# Patient Record
Sex: FEMALE | Race: WHITE | NOT HISPANIC OR LATINO | Employment: FULL TIME | URBAN - METROPOLITAN AREA
[De-identification: names, ages, dates, MRNs, and addresses within clinical notes are randomized per-mention and may not be internally consistent; named-entity substitution may affect disease eponyms.]

---

## 2017-06-17 ENCOUNTER — TRANSFERRED RECORDS (OUTPATIENT)
Dept: HEALTH INFORMATION MANAGEMENT | Facility: CLINIC | Age: 27
End: 2017-06-17

## 2017-10-08 ENCOUNTER — HEALTH MAINTENANCE LETTER (OUTPATIENT)
Age: 27
End: 2017-10-08

## 2018-01-15 ENCOUNTER — OFFICE VISIT (OUTPATIENT)
Dept: URGENT CARE | Facility: URGENT CARE | Age: 28
End: 2018-01-15
Payer: MEDICAID

## 2018-01-15 VITALS
BODY MASS INDEX: 28.1 KG/M2 | WEIGHT: 184.8 LBS | HEART RATE: 76 BPM | OXYGEN SATURATION: 97 % | TEMPERATURE: 97.7 F | DIASTOLIC BLOOD PRESSURE: 71 MMHG | SYSTOLIC BLOOD PRESSURE: 130 MMHG

## 2018-01-15 DIAGNOSIS — R39.89 URINARY PROBLEM: ICD-10-CM

## 2018-01-15 DIAGNOSIS — N30.00 ACUTE CYSTITIS WITHOUT HEMATURIA: Primary | ICD-10-CM

## 2018-01-15 DIAGNOSIS — R82.90 NONSPECIFIC FINDING ON EXAMINATION OF URINE: ICD-10-CM

## 2018-01-15 LAB
ALBUMIN UR-MCNC: ABNORMAL MG/DL
APPEARANCE UR: ABNORMAL
BACTERIA #/AREA URNS HPF: ABNORMAL /HPF
BILIRUB UR QL STRIP: NEGATIVE
COLOR UR AUTO: YELLOW
GLUCOSE UR STRIP-MCNC: NEGATIVE MG/DL
HGB UR QL STRIP: ABNORMAL
KETONES UR STRIP-MCNC: NEGATIVE MG/DL
LEUKOCYTE ESTERASE UR QL STRIP: ABNORMAL
NITRATE UR QL: POSITIVE
NON-SQ EPI CELLS #/AREA URNS LPF: ABNORMAL /LPF
PH UR STRIP: 6 PH (ref 5–7)
RBC #/AREA URNS AUTO: >100 /HPF
SOURCE: ABNORMAL
SP GR UR STRIP: 1.02 (ref 1–1.03)
UROBILINOGEN UR STRIP-ACNC: 0.2 EU/DL (ref 0.2–1)
WBC #/AREA URNS AUTO: ABNORMAL /HPF

## 2018-01-15 PROCEDURE — 87088 URINE BACTERIA CULTURE: CPT | Performed by: PHYSICIAN ASSISTANT

## 2018-01-15 PROCEDURE — 87086 URINE CULTURE/COLONY COUNT: CPT | Performed by: PHYSICIAN ASSISTANT

## 2018-01-15 PROCEDURE — 99213 OFFICE O/P EST LOW 20 MIN: CPT | Performed by: PHYSICIAN ASSISTANT

## 2018-01-15 PROCEDURE — 81001 URINALYSIS AUTO W/SCOPE: CPT | Performed by: PHYSICIAN ASSISTANT

## 2018-01-15 PROCEDURE — 87186 SC STD MICRODIL/AGAR DIL: CPT | Performed by: PHYSICIAN ASSISTANT

## 2018-01-15 RX ORDER — SULFAMETHOXAZOLE/TRIMETHOPRIM 800-160 MG
1 TABLET ORAL 2 TIMES DAILY
Qty: 10 TABLET | Refills: 0 | Status: SHIPPED | OUTPATIENT
Start: 2018-01-15 | End: 2018-01-20

## 2018-01-15 NOTE — MR AVS SNAPSHOT
After Visit Summary   1/15/2018    Shanna Smith    MRN: [id number]           Patient Information     Date Of Birth          1990        Visit Information        Provider Department      1/15/2018 12:45 PM Diamante Heard PA-C [facility]        Today's Diagnoses     Acute cystitis without hematuria    -  1    Urinary problem           Follow-ups after your visit        Who to contact     If you have questions or need follow up information about today's clinic visit or your schedule please contact [facility] directly at [phone].  Normal or non-critical lab and imaging results will be communicated to you by [employer]hart, letter or phone within 4 business days after the clinic has received the results. If you do not hear from us within 7 days, please contact the clinic through Zume Lifet or phone. If you have a critical or abnormal lab result, we will notify you by phone as soon as possible.  Submit refill requests through Koogame or call your pharmacy and they will forward the refill request to us. Please allow 3 business days for your refill to be completed.          Additional Information About Your Visit        MyChart Information     Koogame gives you secure access to your electronic health record. If you see a primary care provider, you can also send messages to your care team and make appointments. If you have questions, please call your primary care clinic.  If you do not have a primary care provider, please call [phone] and they will assist you.        Care EveryWhere ID     This is your Care EveryWhere ID. This could be used by other organizations to access your [place] medical records  [id number]        Your Vitals Were     Pulse Temperature Pulse Oximetry BMI (Body Mass Index)          76 97.7  F (36.5  C) (Oral) 97% 28.1 kg/m2         Blood Pressure from Last 3 Encounters:   01/15/18 130/71   05/27/16 128/79   04/06/16 131/89    Weight from  Last 3 Encounters:   01/15/18 184 lb 12.8 oz (83.8 kg)   05/27/16 186 lb 3.2 oz (84.5 kg)   04/06/16 188 lb 8 oz (85.5 kg)              We Performed the Following     UA with Microscopic reflex to Culture          Today's Medication Changes          These changes are accurate as of: 1/15/18  1:58 PM.  If you have any questions, ask your nurse or doctor.               Start taking these medicines.        Dose/Directions    sulfamethoxazole-trimethoprim 800-160 MG per tablet   Commonly known as:  BACTRIM DS/SEPTRA DS   Used for:  Acute cystitis without hematuria   Started by:  Diamante Heard PA-C        Dose:  1 tablet   Take 1 tablet by mouth 2 times daily for 5 days   Quantity:  10 tablet   Refills:  0            Where to get your medicines      These medications were sent to [facility] - [place], [place] - [address]  [address], [facility] [address]     Phone:  [phone]     sulfamethoxazole-trimethoprim 800-160 MG per tablet                Primary Care Provider Office Phone # Fax #    Anastasiya Porter [phone]       [address] [address]        Equal Access to Services     FEDERICO HOFFMAN AH: Hadii juliann umana hadasho Soomaali, waaxda luqadaha, qaybta kaalmada adeegyada, waxay zachin hayjennifer dumont. So [facility] [phone].    ATENCIÓN: Si habla español, tiene a soto disposición servicios gratuitos de asistencia lingüística. Llame al [phone].    We comply with applicable federal civil rights laws and [place] laws. We do not discriminate on the basis of race, color, national origin, age, disability, sex, sexual orientation, or gender identity.            Thank you!     Thank you for choosing [facility]  for your care. Our goal is always to provide you with excellent care. Hearing back from our patients is one way we can continue to improve our services. Please take a few  minutes to complete the written survey that you may receive in the mail after your visit with us. Thank you!             Your Updated Medication List - Protect others around you: Learn how to safely use, store and throw away your medicines at www.disposemymeds.org.          This list is accurate as of: 1/15/18  1:58 PM.  Always use your most recent med list.                   Brand Name Dispense Instructions for use Diagnosis    fluticasone 50 MCG/ACT spray    FLONASE    16 g    Spray 1-2 sprays into both nostrils daily    Seasonal allergic rhinitis       levonorgestrel 20 MCG/24HR IUD    MIRENA    1 each    One device, intrauterine, for up to 5 years.    Encounter for IUD insertion       loratadine 10 MG tablet    CLARITIN    90 tablet    Take 1 tablet (10 mg) by mouth daily    Seasonal allergic rhinitis       olopatadine HCl 0.2 % Soln    PATADAY    2.5 mL    Place 1 drop into both eyes daily    Seasonal allergic rhinitis       sulfamethoxazole-trimethoprim 800-160 MG per tablet    BACTRIM DS/SEPTRA DS    10 tablet    Take 1 tablet by mouth 2 times daily for 5 days    Acute cystitis without hematuria       TYLENOL 325 MG tablet   Generic drug:  acetaminophen      Take 325-650 mg by mouth every 6 hours as needed. As needed

## 2018-01-15 NOTE — PROGRESS NOTES
SUBJECTIVE:   Shanna Smith is a 28 year old female who presents to clinic today for the following health issues:      UTI      Duration: 1 day    Description (location/character/radiation): pain when urinating, urgency    Intensity:  moderate    Accompanying signs and symptoms: pain when urinating, urgency    History (similar episodes/previous evaluation): None    Precipitating or alleviating factors: urinating    Therapies tried and outcome: None     LMP-1/7/2018, normal      No Known Allergies    Past Medical History:   Diagnosis Date     Closed fracture of metatarsal of right foot 6/12    V MT     H/O colposcopy with cervical biopsy 3/2015    Negative     History of chlamydia 08/2011, 11/2011    repeat infections     LSIL (low grade squamous intraepithelial lesion) on Pap smear 2/4/15    + HR HPV 18         Current Outpatient Prescriptions on File Prior to Visit:  loratadine (CLARITIN) 10 MG tablet Take 1 tablet (10 mg) by mouth daily   fluticasone (FLONASE) 50 MCG/ACT nasal spray Spray 1-2 sprays into both nostrils daily   olopatadine HCl (PATADAY) 0.2 % SOLN Place 1 drop into both eyes daily   levonorgestrel (MIRENA) 20 MCG/24HR IUD One device, intrauterine, for up to 5 years.   acetaminophen (TYLENOL) 325 MG tablet Take 325-650 mg by mouth every 6 hours as needed. As needed     No current facility-administered medications on file prior to visit.     Social History   Substance Use Topics     Smoking status: Never Smoker     Smokeless tobacco: Never Used     Alcohol use No       ROS:  General: negative for fever  ABD: Denies abd pain  : as above    OBJECTIVE:  /71 (BP Location: Left arm, Patient Position: Chair, Cuff Size: Adult Regular)  Pulse 76  Temp 97.7  F (36.5  C) (Oral)  Wt 184 lb 12.8 oz (83.8 kg)  SpO2 97%  BMI 28.1 kg/m2   General:   awake, alert, and cooperative.  NAD.   Head: Normocephalic, atraumatic.  Eyes: Conjunctiva clear, non icteric.   ABD: soft, no tenderness to palpation ,  no rigidity, guarding or rebound . No CVAT  Neuro: Alert and oriented - normal speech.   Results for orders placed or performed in visit on 01/15/18   UA with Microscopic reflex to Culture   Result Value Ref Range    Color Urine Yellow     Appearance Urine Cloudy     Glucose Urine Negative NEG^Negative mg/dL    Bilirubin Urine Negative NEG^Negative    Ketones Urine Negative NEG^Negative mg/dL    Specific Gravity Urine 1.020 1.003 - 1.035    pH Urine 6.0 5.0 - 7.0 pH    Protein Albumin Urine Trace (A) NEG^Negative mg/dL    Urobilinogen Urine 0.2 0.2 - 1.0 EU/dL    Nitrite Urine Positive (A) NEG^Negative    Blood Urine Large (A) NEG^Negative    Leukocyte Esterase Urine Small (A) NEG^Negative    Source Midstream Urine     WBC Urine 5-10 (A) OTO2^O - 2 /HPF    RBC Urine >100 (A) OTO2^O - 2 /HPF    Squamous Epithelial /LPF Urine Moderate (A) FEW^Few /LPF    Bacteria Urine Many (A) NEG^Negative /HPF       ASSESSMENT:      ICD-10-CM    1. Acute cystitis without hematuria N30.00 sulfamethoxazole-trimethoprim (BACTRIM DS/SEPTRA DS) 800-160 MG per tablet   2. Urinary problem R39.89 UA with Microscopic reflex to Culture             PLAN:   As per ordered above.  Drink plenty of fluids.  Prevention and treatment of UTI's discussed. Follow up with primary care physician if not improving.  Advised about symptoms which might herald more serious problems.      Diamante Heard PA-C

## 2018-01-15 NOTE — NURSING NOTE
"Chief Complaint   Patient presents with     UTI     Patient complains of urinary problems       Initial /71 (BP Location: Left arm, Patient Position: Chair, Cuff Size: Adult Regular)  Pulse 76  Temp 97.7  F (36.5  C) (Oral)  Wt 184 lb 12.8 oz (83.8 kg)  SpO2 97%  BMI 28.1 kg/m2 Estimated body mass index is 28.1 kg/(m^2) as calculated from the following:    Height as of 5/27/16: 5' 8\" (1.727 m).    Weight as of this encounter: 184 lb 12.8 oz (83.8 kg).  Medication Reconciliation: complete       Alicia Fishman    "

## 2018-01-16 LAB
BACTERIA SPEC CULT: ABNORMAL
SPECIMEN SOURCE: ABNORMAL

## 2018-08-01 ENCOUNTER — OFFICE VISIT (OUTPATIENT)
Dept: OBGYN | Facility: CLINIC | Age: 28
End: 2018-08-01
Payer: COMMERCIAL

## 2018-08-01 VITALS
OXYGEN SATURATION: 98 % | HEART RATE: 82 BPM | SYSTOLIC BLOOD PRESSURE: 119 MMHG | DIASTOLIC BLOOD PRESSURE: 81 MMHG | BODY MASS INDEX: 28.22 KG/M2 | WEIGHT: 185.6 LBS

## 2018-08-01 DIAGNOSIS — Z32.01 PREGNANCY TEST POSITIVE: ICD-10-CM

## 2018-08-01 DIAGNOSIS — N91.2 ABSENCE OF MENSTRUATION: Primary | ICD-10-CM

## 2018-08-01 LAB — BETA HCG QUAL IFA URINE: POSITIVE

## 2018-08-01 PROCEDURE — 84703 CHORIONIC GONADOTROPIN ASSAY: CPT | Performed by: OBSTETRICS & GYNECOLOGY

## 2018-08-01 PROCEDURE — 99213 OFFICE O/P EST LOW 20 MIN: CPT | Performed by: OBSTETRICS & GYNECOLOGY

## 2018-08-01 RX ORDER — PRENATAL VIT/IRON FUM/FOLIC AC 27MG-0.8MG
1 TABLET ORAL DAILY
Qty: 100 TABLET | Refills: 3 | Status: SHIPPED | OUTPATIENT
Start: 2018-08-01 | End: 2020-02-12

## 2018-08-01 NOTE — PROGRESS NOTES
Shanna is a 28 year old,  3 para  who presents today with absence of menses. LMP was 2018.    Obstetric History       T2      L2     SAB1   TAB0   Ectopic0   Multiple0   Live Births2       # Outcome Date GA Lbr Kelechi/2nd Weight Sex Delivery Anes PTL Lv   4 Current            3 Term 10/15/15 38w0d  10 lb 3 oz (4.621 kg) F    [facility]      Name: Tanisha Luong   2 Term 13 40w0d  8 lb 7 oz (3.827 kg) F Vag-Spont   [facility]      Name: Sherrie Meyers SAB 12 12w0d                 Gynecological History         LMP:  2018      no STD/ no PID/ she did use an IUD  no abnormal pap smears       Past Medical History:   Diagnosis Date     Closed fracture of metatarsal of right foot     V MT     H/O colposcopy with cervical biopsy 3/2015    Negative     History of chlamydia 2011, 2011    repeat infections     LSIL (low grade squamous intraepithelial lesion) on Pap smear 2/4/15    + HR HPV 18       Past Surgical History:   Procedure Laterality Date     NO HISTORY OF SURGERY         No Known Allergies    Current Outpatient Prescriptions   Medication Sig Dispense Refill     acetaminophen (TYLENOL) 325 MG tablet Take 325-650 mg by mouth every 6 hours as needed. As needed       fluticasone (FLONASE) 50 MCG/ACT nasal spray Spray 1-2 sprays into both nostrils daily (Patient not taking: Reported on 2018) 16 g 4     loratadine (CLARITIN) 10 MG tablet Take 1 tablet (10 mg) by mouth daily (Patient not taking: Reported on 2018) 90 tablet 3       Social History   Substance Use Topics     Smoking status: Never Smoker     Smokeless tobacco: Never Used     Alcohol use No       Family History   Problem Relation Age of Onset     Asthma Mother      Hypertension Mother      Asthma Brother      Diabetes No family hx of      Breast Cancer No family hx of      Cancer - colorectal No family hx of          ROS:    4 point ROS including Respiratory, CV, GI and , other than that noted in the HPI  and the PMH,  is negative       EXAM:  /81 (BP Location: Right arm, Cuff Size: Adult Regular)  Pulse 82  Wt 185 lb 9.6 oz (84.2 kg)  LMP 06/22/2018 (Exact Date)  SpO2 98%  BMI 28.22 kg/m2  General:  WNWD female, NAD  Alert  Oriented x 3  Gait:  Normal  Skin:  Normal skin turgor  HEENT:  NC/AT, EOMI  Abdomen:  Non-tender, non-distended.  Pelvic exam:  Not performed  Extremities:  No clubbing, no cyanosis and no edema.      A: Missed Menses  Weeks gestation: 5.5 weeks  TULIO: March 29, 2019.      P: 1) Prenatal vitamins  2) Diet, exercise, work, and environmental hazards reviewed. Toxoplasmosis precautions. Discussed avoidance of tobacco, ETOH, and street drugs. Signs and symptoms to monitor for and report discussed. Will schedule first OB visit at 10-12 weeks gestation.   Approximately 20 minutes face to face time was spent with the patient today entirely in education and counseling regarding first trimester anticipatory guidance, prenatal visit schedule, delivery hospital.    Alpesh Rodríguez MD

## 2018-08-01 NOTE — MR AVS SNAPSHOT
After Visit Summary   8/1/2018    Shanna Smith    MRN: [id number]           Patient Information     Date Of Birth          1990        Visit Information        Provider Department      8/1/2018 11:00 AM Alpesh Rodríguez MD [facility]        Today's Diagnoses     Absence of menstruation    -  1    Pregnancy test positive           Follow-ups after your visit        Follow-up notes from your care team     Return in about 5 weeks (around 9/5/2018).      Your next 10 appointments already scheduled     Sep 05, 2018  8:30 AM CDT   Office Visit with Alpesh Rodríguez MD   [facility] ([facility])    [address] [place] [id number] [phone]           Bring a current list of meds and any records pertaining to this visit. For Physicals, please bring immunization records and any forms needing to be filled out. Please arrive 10 minutes early to complete paperwork.              Who to contact     If you have questions or need follow up information about today's clinic visit or your schedule please contact [facility] directly at [phone].  Normal or non-critical lab and imaging results will be communicated to you by MyChart, letter or phone within 4 business days after the clinic has received the results. If you do not hear from us within 7 days, please contact the clinic through Trad[employer] or phone. If you have a critical or abnormal lab result, we will notify you by phone as soon as possible.  Submit refill requests through KeepFu or call your pharmacy and they will forward the refill request to us. Please allow 3 business days for your refill to be completed.          Additional Information About Your Visit        MyChart Information     Keep[employer] gives you secure access to your electronic health record. If you see a primary care provider, you can also send messages to your care team and make appointments. If you have  questions, please call your primary care clinic.  If you do not have a primary care provider, please call [phone] and they will assist you.        Care EveryWhere ID     This is your Care EveryWhere ID. This could be used by other organizations to access your [place] medical records  [id number]        Your Vitals Were     Pulse Last Period Pulse Oximetry BMI (Body Mass Index)          82 06/22/2018 (Exact Date) 98% 28.22 kg/m2         Blood Pressure from Last 3 Encounters:   08/01/18 119/81   01/15/18 130/71   05/27/16 128/79    Weight from Last 3 Encounters:   08/01/18 185 lb 9.6 oz (84.2 kg)   01/15/18 184 lb 12.8 oz (83.8 kg)   05/27/16 186 lb 3.2 oz (84.5 kg)              We Performed the Following     Beta HCG qual IFA urine          Today's Medication Changes          These changes are accurate as of 8/1/18 11:48 AM.  If you have any questions, ask your nurse or doctor.               Start taking these medicines.        Dose/Directions    prenatal multivitamin plus iron 27-0.8 MG Tabs per tablet   Used for:  Pregnancy test positive   Started by:  Alpesh Rodríguez MD        Dose:  1 tablet   Take 1 tablet by mouth daily   Quantity:  100 tablet   Refills:  3            Where to get your medicines      These medications were sent to [facility] [place] - [address] [place] [address]  [address] [place] [address] [address], [facility] [address]     Phone:  [phone]     prenatal multivitamin plus iron 27-0.8 MG Tabs per tablet                Primary Care Provider Office Phone # Fax #    Anastasiya Porter [phone] [address] [facility] [address]        Equal Access to Services     AUDREY HOFFMAN AH: Jovani Cobb, nell bradley, tucker bautista, eliezer dumont. So [facility] [phone].    ATENCIÓN: Si habla español, tiene a soto disposición servicios gratuitos de asistencia lingüística. Llame al  [phone].    We comply with applicable federal civil rights laws and [place] laws. We do not discriminate on the basis of race, color, national origin, age, disability, sex, sexual orientation, or gender identity.            Thank you!     Thank you for choosing [facility]  for your care. Our goal is always to provide you with excellent care. Hearing back from our patients is one way we can continue to improve our services. Please take a few minutes to complete the written survey that you may receive in the mail after your visit with us. Thank you!             Your Updated Medication List - Protect others around you: Learn how to safely use, store and throw away your medicines at www.disposemymeds.org.          This list is accurate as of 8/1/18 11:48 AM.  Always use your most recent med list.                   Brand Name Dispense Instructions for use Diagnosis    fluticasone 50 MCG/ACT spray    FLONASE    16 g    Spray 1-2 sprays into both nostrils daily    Seasonal allergic rhinitis       loratadine 10 MG tablet    CLARITIN    90 tablet    Take 1 tablet (10 mg) by mouth daily    Seasonal allergic rhinitis       prenatal multivitamin plus iron 27-0.8 MG Tabs per tablet     100 tablet    Take 1 tablet by mouth daily    Pregnancy test positive       TYLENOL 325 MG tablet   Generic drug:  acetaminophen      Take 325-650 mg by mouth every 6 hours as needed. As needed

## 2018-08-01 NOTE — LETTER
August 1, 2018      Shanna [place]  [address] [facility] [address]            To whom it may concern      Shanna has been seen by me.  She is pregnant with an estimated due date of March 29, 2019.        Sincerely,          Alpesh Rodríguez MD

## 2018-08-03 ENCOUNTER — OFFICE VISIT (OUTPATIENT)
Dept: FAMILY MEDICINE | Facility: CLINIC | Age: 28
End: 2018-08-03
Payer: COMMERCIAL

## 2018-08-03 VITALS
BODY MASS INDEX: 28.46 KG/M2 | TEMPERATURE: 98.7 F | HEIGHT: 68 IN | DIASTOLIC BLOOD PRESSURE: 70 MMHG | SYSTOLIC BLOOD PRESSURE: 118 MMHG | WEIGHT: 187.8 LBS | RESPIRATION RATE: 15 BRPM | OXYGEN SATURATION: 98 % | HEART RATE: 80 BPM

## 2018-08-03 DIAGNOSIS — H10.33 ACUTE CONJUNCTIVITIS OF BOTH EYES, UNSPECIFIED ACUTE CONJUNCTIVITIS TYPE: Primary | ICD-10-CM

## 2018-08-03 PROCEDURE — 99213 OFFICE O/P EST LOW 20 MIN: CPT | Performed by: FAMILY MEDICINE

## 2018-08-03 RX ORDER — POLYMYXIN B SULFATE AND TRIMETHOPRIM 1; 10000 MG/ML; [USP'U]/ML
1-2 SOLUTION OPHTHALMIC EVERY 6 HOURS
Qty: 3 ML | Refills: 0 | Status: SHIPPED | OUTPATIENT
Start: 2018-08-03 | End: 2018-08-10

## 2018-08-03 NOTE — MR AVS SNAPSHOT
After Visit Summary   8/3/2018    Shanna Smith    MRN: [id number]           Patient Information     Date Of Birth          1990        Visit Information        Provider Department      8/3/2018 8:20 AM Leonel David MD [facility]        Today's Diagnoses     Acute conjunctivitis of both eyes, unspecified acute conjunctivitis type    -  1      Care Instructions    [facility]    If you have any questions regarding to your visit please contact your care team:       Team Purple:   Clinic Hours Telephone Number   Dr. Anastasiya Benjamin   7am-7pm  Monday - Thursday   7am-5pm  Fridays  [phone]  (Appointment scheduling available 24/7)    Questions about your recent visit?   Team Line:  [phone]   Urgent Care - [place] and [facility] - 11am-9pm Monday-Friday Saturday-Sunday- 9am-5pm   [place] - 5pm-9pm Monday-Friday Saturday-Sunday- 9am-5pm  [phone] - [place]  [phone] [facility]       What options do I have for a visit other than an office visit? We offer electronic visits (e-visits) and telephone visits, when medically appropriate.  Please check with your medical insurance to see if these types of visits are covered, as you will be responsible for any charges that are not paid by your insurance.      You can use "[employer] (secure electronic communication) to access to your chart, send your primary care provider a message, or make an appointment. Ask a team member how to get started.     For a price quote for your services, please call our Consumer Price Line at [phone] or our Imaging Cost estimation line at [phone] (for imaging tests).    Norberto Ortiz    What Is Conjunctivitis?    Conjunctivitis is an irritation or infection. It affects the membrane that covers the white of your eye and the inside of your eyelid (conjunctiva). It can happen to one or both eyes. The  membrane swells and the blood vessels enlarge (dilate). This makes your eye red. That's why conjunctivitis is sometimes called red eye or pink eye.  What are the symptoms?  If you have one or more of these symptoms, see an eye healthcare provider:    Redness in and around your eye    Eyes that are puffy and sore    Itching, burning, or stinging eyes    Watery eyes or discharge from your eye    Eyelids that are crusty or stuck together when you wake up in the morning    Pink color in the whites of one or both eyes    Sensitivity to bright light  Getting treatment quickly can help prevent damage to your eyes.  How is it diagnosed?  Conjunctivitis is usually a minor eye infection. But it can sometimes become a more serious problem. Some more serious eye diseases have symptoms that look like conjunctivitis. So it's important for an eye healthcare provider to diagnose you. Your eye healthcare provider will ask about your symptoms and any medicines you take. He or she will ask about any illnesses or medical conditions you may have. The healthcare provider will also check your eyes with a hand-held light and a special microscope called a slit lamp.  Date Last Reviewed: 10/1/2017    9803-3218 [employer]. [address] [place] [address]. All rights reserved. This information is not intended as a substitute for professional medical care. Always follow your healthcare professional's instructions.                Follow-ups after your visit        Your next 10 appointments already scheduled     Sep 05, 2018  8:30 AM CDT   Office Visit with Alpesh Rodríguez MD   [facility] [address]   [phone]           Bring a current list of meds and any records pertaining to this visit. For Physicals, please bring immunization records and any forms needing to be filled out. Please arrive 10 minutes early to complete paperwork.     "          Who to contact     If you have questions or need follow up information about today's clinic visit or your schedule please contact [facility] directly at [phone].  Normal or non-critical lab and imaging results will be communicated to you by MyChart, letter or phone within 4 business days after the clinic has received the results. If you do not hear from us within 7 days, please contact the clinic through [employer] or phone. If you have a critical or abnormal lab result, we will notify you by phone as soon as possible.  Submit refill requests through [employer] or call your pharmacy and they will forward the refill request to us. Please allow 3 business days for your refill to be completed.          Additional Information About Your Visit        [employer] Information     [employer] gives you secure access to your electronic health record. If you see a primary care provider, you can also send messages to your care team and make appointments. If you have questions, please call your primary care clinic.  If you do not have a primary care provider, please call [phone] and they will assist you.        Care EveryWhere ID     This is your Care EveryWhere ID. This could be used by other organizations to access your [place] medical records  [id number]        Your Vitals Were     Pulse Temperature Respirations Height Last Period Pulse Oximetry    80 98.7  F (37.1  C) (Oral) 15 5' 8\" (1.727 m) 06/22/2018 (Exact Date) 98%    Breastfeeding? BMI (Body Mass Index)                No 28.55 kg/m2           Blood Pressure from Last 3 Encounters:   08/03/18 118/70   08/01/18 119/81   01/15/18 130/71    Weight from Last 3 Encounters:   08/03/18 187 lb 12.8 oz (85.2 kg)   08/01/18 185 lb 9.6 oz (84.2 kg)   01/15/18 184 lb 12.8 oz (83.8 kg)              Today, you had the following     No orders found for display         Today's Medication Changes          These changes are accurate as of 8/3/18  8:41 AM.  If you " have any questions, ask your nurse or doctor.               Start taking these medicines.        Dose/Directions    trimethoprim-polymyxin b ophthalmic solution   Commonly known as:  POLYTRIM   Used for:  Acute conjunctivitis of both eyes, unspecified acute conjunctivitis type   Started by:  Leonel David MD        Dose:  1-2 drop   Apply 1-2 drops to eye every 6 hours for 7 days   Quantity:  3 mL   Refills:  0            Where to get your medicines      These medications were sent to [facility] [address] [place] [address]  [address] [facility] [address], [facility] [address]     Phone:  [phone]     trimethoprim-polymyxin b ophthalmic solution                Primary Care Provider Office Phone # Fax #    Anastasiya Porter [phone] [address] [facility] [address]        Equal Access to Services     [facility]: Hadii juliann ku hadasho Soomaali, waaxda luqadaha, qaybta kaalmada adeegyada, waxay zachin hayjennifer deleon . So [facility] [phone].    ATENCIÓN: Si habla español, tiene a soto disposición servicios gratuitos de asistencia lingüística. Llame al [phone].    We comply with applicable federal civil rights laws and [place] laws. We do not discriminate on the basis of race, color, national origin, age, disability, sex, sexual orientation, or gender identity.            Thank you!     Thank you for choosing [facility]  for your care. Our goal is always to provide you with excellent care. Hearing back from our patients is one way we can continue to improve our services. Please take a few minutes to complete the written survey that you may receive in the mail after your visit with us. Thank you!             Your Updated Medication List - Protect others around you: Learn how to safely use, store and throw away your medicines at www.disposemymeds.org.          This list is accurate as of 8/3/18  8:41 AM.  Always  use your most recent med list.                   Brand Name Dispense Instructions for use Diagnosis    fluticasone 50 MCG/ACT spray    FLONASE    16 g    Spray 1-2 sprays into both nostrils daily    Seasonal allergic rhinitis       loratadine 10 MG tablet    CLARITIN    90 tablet    Take 1 tablet (10 mg) by mouth daily    Seasonal allergic rhinitis       prenatal multivitamin plus iron 27-0.8 MG Tabs per tablet     100 tablet    Take 1 tablet by mouth daily    Pregnancy test positive       trimethoprim-polymyxin b ophthalmic solution    POLYTRIM    3 mL    Apply 1-2 drops to eye every 6 hours for 7 days    Acute conjunctivitis of both eyes, unspecified acute conjunctivitis type       TYLENOL 325 MG tablet   Generic drug:  acetaminophen      Take 325-650 mg by mouth every 6 hours as needed. As needed

## 2018-08-03 NOTE — PATIENT INSTRUCTIONS
[facility]    If you have any questions regarding to your visit please contact your care team:       Team Purple:   Clinic Hours Telephone Number   Dr. Anastasiya Benjamin   7am-7pm  Monday - Thursday   7am-5pm  Fridays  [phone]  (Appointment scheduling available 24/7)    Questions about your recent visit?   Team Line:  [phone]   Urgent Care - [place] and [facility] - 11am-9pm Monday-Friday Saturday-Sunday- 9am-5pm   [place] - 5pm-9pm Monday-Friday Saturday-Sunday- 9am-5pm  [phone] - [place]  [phone] [facility]       What options do I have for a visit other than an office visit? We offer electronic visits (e-visits) and telephone visits, when medically appropriate.  Please check with your medical insurance to see if these types of visits are covered, as you will be responsible for any charges that are not paid by your insurance.      You can use [employer] (secure electronic communication) to access to your chart, send your primary care provider a message, or make an appointment. Ask a team member how to get started.     For a price quote for your services, please call our Consumer Price Line at [phone] or our Imaging Cost estimation line at [phone] (for imaging tests).    Norberto Ortiz    What Is Conjunctivitis?    Conjunctivitis is an irritation or infection. It affects the membrane that covers the white of your eye and the inside of your eyelid (conjunctiva). It can happen to one or both eyes. The membrane swells and the blood vessels enlarge (dilate). This makes your eye red. That's why conjunctivitis is sometimes called red eye or pink eye.  What are the symptoms?  If you have one or more of these symptoms, see an eye healthcare provider:    Redness in and around your eye    Eyes that are puffy and sore    Itching, burning, or stinging eyes    Watery eyes or discharge from your eye    Eyelids that are  crusty or stuck together when you wake up in the morning    Pink color in the whites of one or both eyes    Sensitivity to bright light  Getting treatment quickly can help prevent damage to your eyes.  How is it diagnosed?  Conjunctivitis is usually a minor eye infection. But it can sometimes become a more serious problem. Some more serious eye diseases have symptoms that look like conjunctivitis. So it's important for an eye healthcare provider to diagnose you. Your eye healthcare provider will ask about your symptoms and any medicines you take. He or she will ask about any illnesses or medical conditions you may have. The healthcare provider will also check your eyes with a hand-held light and a special microscope called a slit lamp.  Date Last Reviewed: 10/1/2017    5998-0204 [employer]. [address] [place] [address], [place] [address]. All rights reserved. This information is not intended as a substitute for professional medical care. Always follow your healthcare professional's instructions.

## 2018-08-03 NOTE — PROGRESS NOTES
"  SUBJECTIVE:   Shanna Smith is a 28 year old female who presents to clinic today for the following health issues:    Eye(s) Problem    Duration: x this morning     Description:  Location: bilateral, but more so the left eye  Pain: YES  Redness: YES, Pinkish  Discharge: YES    Accompanying signs and symptoms: none    History (Trauma, foreign body exposure,): None    Precipitating or alleviating factors (contact use): None    Therapies tried and outcome: None    Problem list and histories reviewed & adjusted, as indicated.  Additional history: as documented    Patient Active Problem List   Diagnosis     CARDIOVASCULAR SCREENING; LDL GOAL LESS THAN 160     LSIL (low grade squamous intraepithelial lesion) on Pap smear     Past Surgical History:   Procedure Laterality Date     NO HISTORY OF SURGERY         Social History   Substance Use Topics     Smoking status: Never Smoker     Smokeless tobacco: Never Used     Alcohol use No     Family History   Problem Relation Age of Onset     Asthma Mother      Hypertension Mother      Asthma Brother      Diabetes No family hx of      Breast Cancer No family hx of      Cancer - colorectal No family hx of          Reviewed and updated as needed this visit by clinical staff  Tobacco  Allergies  Meds  Med Hx  Surg Hx  Fam Hx  Soc Hx      ROS:  Constitutional, ENT, cardiovascular, pulmonary, gi and gu systems are negative, except as otherwise noted.    OBJECTIVE:     /70 (BP Location: Left arm, Patient Position: Chair, Cuff Size: Adult Regular)  Pulse 80  Temp 98.7  F (37.1  C) (Oral)  Resp 15  Ht 5' 8\" (1.727 m)  Wt 187 lb 12.8 oz (85.2 kg)  LMP 06/22/2018 (Exact Date)  SpO2 98%  Breastfeeding? No  BMI 28.55 kg/m2  Body mass index is 28.55 kg/(m^2).  GENERAL: healthy, alert and no distress  EYES: Left eye with scleral erythema  NECK: no adenopathy and thyroid normal to palpation  RESP: lungs clear to auscultation - no rales, rhonchi or wheezes  CV: regular rate " and rhythm, normal S1 S2, no S3 or S4, no murmur, click or rub  ABDOMEN: soft, nontender, no masses and bowel sounds normal  MS: no gross musculoskeletal defects noted, no edema    Diagnostic Test Results:  none     ASSESSMENT/PLAN:     (H10.33) Acute conjunctivitis of both eyes, unspecified acute conjunctivitis type  (primary encounter diagnosis)  Comment: Antibiotic drops per order. Hygiene discussed. If other family members develop same condition, may use same medication for them if they are not known to be allergic to it. Call prn.  Plan: trimethoprim-polymyxin b (POLYTRIM) ophthalmic         solution    Leonel David MD  [facility]

## 2018-08-03 NOTE — NURSING NOTE
"Chief Complaint   Patient presents with     Eye Problem     Both eyes, but the left eye is worse     Initial /70 (BP Location: Left arm, Patient Position: Chair, Cuff Size: Adult Regular)  Pulse 80  Temp 98.7  F (37.1  C) (Oral)  Resp 15  Ht 5' 8\" (1.727 m)  Wt 187 lb 12.8 oz (85.2 kg)  LMP 06/22/2018 (Exact Date)  SpO2 98%  Breastfeeding? No  BMI 28.55 kg/m2 Estimated body mass index is 28.55 kg/(m^2) as calculated from the following:    Height as of this encounter: 5' 8\" (1.727 m).    Weight as of this encounter: 187 lb 12.8 oz (85.2 kg).  BP completed using cuff size: daiana Ortiz  "

## 2018-09-05 ENCOUNTER — PRENATAL OFFICE VISIT (OUTPATIENT)
Dept: OBGYN | Facility: CLINIC | Age: 28
End: 2018-09-05
Payer: COMMERCIAL

## 2018-09-05 VITALS
OXYGEN SATURATION: 97 % | SYSTOLIC BLOOD PRESSURE: 121 MMHG | WEIGHT: 193 LBS | BODY MASS INDEX: 29.35 KG/M2 | DIASTOLIC BLOOD PRESSURE: 72 MMHG | HEART RATE: 90 BPM

## 2018-09-05 DIAGNOSIS — O09.291 CURRENT PREGNANCY IN FIRST TRIMESTER WITH HISTORY OF SPONTANEOUS ABORTION DURING PRIOR PREGNANCY: Primary | ICD-10-CM

## 2018-09-05 DIAGNOSIS — Z12.4 PAP SMEAR FOR CERVICAL CANCER SCREENING: ICD-10-CM

## 2018-09-05 LAB
ABO + RH BLD: NORMAL
ABO + RH BLD: NORMAL
BASOPHILS # BLD AUTO: 0 10E9/L (ref 0–0.2)
BASOPHILS NFR BLD AUTO: 0.1 %
BLD GP AB SCN SERPL QL: NORMAL
BLOOD BANK CMNT PATIENT-IMP: NORMAL
DIFFERENTIAL METHOD BLD: NORMAL
EOSINOPHIL # BLD AUTO: 0.2 10E9/L (ref 0–0.7)
EOSINOPHIL NFR BLD AUTO: 2.4 %
ERYTHROCYTE [DISTWIDTH] IN BLOOD BY AUTOMATED COUNT: 12.6 % (ref 10–15)
HBV SURFACE AG SERPL QL IA: NONREACTIVE
HCT VFR BLD AUTO: 37.3 % (ref 35–47)
HGB BLD-MCNC: 12.8 G/DL (ref 11.7–15.7)
HIV 1+2 AB+HIV1 P24 AG SERPL QL IA: NONREACTIVE
LYMPHOCYTES # BLD AUTO: 1.2 10E9/L (ref 0.8–5.3)
LYMPHOCYTES NFR BLD AUTO: 14.9 %
MCH RBC QN AUTO: 31.1 PG (ref 26.5–33)
MCHC RBC AUTO-ENTMCNC: 34.3 G/DL (ref 31.5–36.5)
MCV RBC AUTO: 91 FL (ref 78–100)
MONOCYTES # BLD AUTO: 0.5 10E9/L (ref 0–1.3)
MONOCYTES NFR BLD AUTO: 5.8 %
NEUTROPHILS # BLD AUTO: 6.2 10E9/L (ref 1.6–8.3)
NEUTROPHILS NFR BLD AUTO: 76.8 %
PLATELET # BLD AUTO: 240 10E9/L (ref 150–450)
RBC # BLD AUTO: 4.12 10E12/L (ref 3.8–5.2)
RUBV IGG SERPL IA-ACNC: 45 IU/ML
SPECIMEN EXP DATE BLD: NORMAL
T PALLIDUM AB SER QL: NONREACTIVE
WBC # BLD AUTO: 8.1 10E9/L (ref 4–11)

## 2018-09-05 PROCEDURE — 87340 HEPATITIS B SURFACE AG IA: CPT | Performed by: OBSTETRICS & GYNECOLOGY

## 2018-09-05 PROCEDURE — 86850 RBC ANTIBODY SCREEN: CPT | Performed by: OBSTETRICS & GYNECOLOGY

## 2018-09-05 PROCEDURE — 85025 COMPLETE CBC W/AUTO DIFF WBC: CPT | Performed by: OBSTETRICS & GYNECOLOGY

## 2018-09-05 PROCEDURE — 86901 BLOOD TYPING SEROLOGIC RH(D): CPT | Performed by: OBSTETRICS & GYNECOLOGY

## 2018-09-05 PROCEDURE — 87389 HIV-1 AG W/HIV-1&-2 AB AG IA: CPT | Performed by: OBSTETRICS & GYNECOLOGY

## 2018-09-05 PROCEDURE — 87086 URINE CULTURE/COLONY COUNT: CPT | Performed by: OBSTETRICS & GYNECOLOGY

## 2018-09-05 PROCEDURE — G0145 SCR C/V CYTO,THINLAYER,RESCR: HCPCS | Performed by: OBSTETRICS & GYNECOLOGY

## 2018-09-05 PROCEDURE — 86762 RUBELLA ANTIBODY: CPT | Performed by: OBSTETRICS & GYNECOLOGY

## 2018-09-05 PROCEDURE — 99207 ZZC FIRST OB VISIT: CPT | Performed by: OBSTETRICS & GYNECOLOGY

## 2018-09-05 PROCEDURE — 86900 BLOOD TYPING SEROLOGIC ABO: CPT | Performed by: OBSTETRICS & GYNECOLOGY

## 2018-09-05 PROCEDURE — 86780 TREPONEMA PALLIDUM: CPT | Performed by: OBSTETRICS & GYNECOLOGY

## 2018-09-05 PROCEDURE — 36415 COLL VENOUS BLD VENIPUNCTURE: CPT | Performed by: OBSTETRICS & GYNECOLOGY

## 2018-09-05 NOTE — LETTER
October 24, 2018      Shanna Smith  [address] [facility] [address]    Dear ,      I am happy to inform you that your recent cervical cancer screening test (PAP smear) was normal.      Preventative screenings such as this help to ensure your health for years to come. You should repeat a pap smear in 3 years, unless otherwise directed.      You will still need to return to the clinic every year for your annual exam and other preventive tests.     If you have additional questions regarding this result, please call our registered nurse, Yomaira at [phone].      Sincerely,      Alpesh Rodríguez MD/isaac

## 2018-09-05 NOTE — PROGRESS NOTES
INITIAL OB ASSESSMENT............................................Date: 2018                            ---------------------    Name: Shanna Smith.......................................................................Plan Number: [id number]    Age: 28 year old   : 1990  Phone: [phone] (home)   Address: [address] [place] [address]    Marital Status:    Race/Ethnicity:   Occupation:   [employer]  Partner's Name:  Souleymane Smith    OB Physician: Alpesh Rodríguez       LMP:  Patient's LMP from OB Dating Form:  Patient's last menstrual period was 2018 (exact date).  Regular menses? yes  Menses every month.   Length of menses: 5 days    Obstetrical History  ===================  Obstetric History       T2      L2     SAB1   TAB0   Ectopic0   Multiple0   Live Births2       # Outcome Date GA Lbr Kelechi/2nd Weight Sex Delivery Anes PTL Lv   4 Current            3 Term 10/15/15 38w0d  10 lb 3 oz (4.621 kg) F    KALINA      Name: Tanisha Luong   2 Term 13 40w0d  8 lb 7 oz (3.827 kg) F Vag-Spont   KALINA      Name: Sherrie   1 SAB 12 12w0d                 Past Medical History:  Past Medical History:   Diagnosis Date     Closed fracture of metatarsal of right foot     V MT     H/O colposcopy with cervical biopsy 3/2015    Negative     History of chlamydia 2011, 2011    repeat infections     LSIL (low grade squamous intraepithelial lesion) on Pap smear 2/4/15    + HR HPV 18       Past Surgical History:  Past Surgical History:   Procedure Laterality Date     NO HISTORY OF SURGERY         Current Outpatient Prescriptions   Medication Sig Dispense Refill     Prenatal Vit-Fe Fumarate-FA (PRENATAL MULTIVITAMIN PLUS IRON) 27-0.8 MG TABS per tablet Take 1 tablet by mouth daily 100 tablet 3     acetaminophen (TYLENOL) 325 MG tablet Take 325-650 mg by mouth every 6 hours as needed. As needed       fluticasone (FLONASE) 50  MCG/ACT nasal spray Spray 1-2 sprays into both nostrils daily 16 g 4     loratadine (CLARITIN) 10 MG tablet Take 1 tablet (10 mg) by mouth daily 90 tablet 3       No Known Allergies    Social History     Social History     Marital status:      Spouse name: Souleymane     Number of children: 1     Years of education: N/A     Occupational History      [employer]     Social History Main Topics     Smoking status: Never Smoker     Smokeless tobacco: Never Used     Alcohol use No     Drug use: No     Sexual activity: Yes     Partners: Male     Other Topics Concern     Parent/Sibling W/ Cabg, Mi Or Angioplasty Before 65f 55m? No     Social History Narrative     , Children  No substance abuse, environmental exposures, mental health risks and No financial concerns,pets. Partner support.       Family History   Problem Relation Age of Onset     Asthma Mother      Hypertension Mother      Asthma Brother      Diabetes No family hx of      Breast Cancer No family hx of      Cancer - colorectal No family hx of        Past Medical History of Father of Baby:   No significant medical history     No known genetic disease in patient's 1st or 2nd degree relatives  No known genetic diseases in the FOB's 1st or 2nd degree relatives      REVIEW OF SYMPTOMS:   History Since Last Menstrual Period:    nausea, fatigue and breast tenderness       PHYSICAL EXAM:  /72 (BP Location: Right arm, Cuff Size: Adult Regular)  Pulse 90  Wt 193 lb (87.5 kg)  LMP 06/22/2018 (Exact Date)  SpO2 97%  Breastfeeding? No  BMI 29.35 kg/m2  General:  WNWD female, NAD  Oriented:  X 3  Alert  PSYCH:  mentation appears normal., affect and mood normal  HEENT:  NC/AT, EOMI  NECK:  Neck supple. No adenopathy. Thyroid symmetric, normal size,, Carotids without bruits.  HEART:  RRR  LUNGS:  Clear to auscultation.  Good respiratory effort  BREASTS: declined   ABDOMEN: Benign, Soft, flat, non-tender, No masses, organomegaly and Bowel sounds normoactive  FHT's heard  VULVA: no masses or lesions seen  BUS:  B[place]olin's, Urethra, [place]'s normal  VAGINA:  No masses or lesions seen.   CERVIX:  Parous, closed, mobile, no discharge  UTERUS:  Normal shape, position and consistency and Nontender; 10-12 week size  ADNEXA:  No masses palpated, non-tender  EXTREMITIES:No cyanosis, clubbing, warm and well perfused and No edema   GAIT: normal including tandem walk, heel and toe walk.        Assessment:   IUP at 10 5/7 weeks  Pregnancy history of ab     Plan:  Options for  testing for chromosomal and birth defects were discussed with the patient.  Diagnostic tests include CVS and Amniocentesis.  We discussed that these tests are definitive but invasive and do carry a risk of fetal loss.    Screening tests include nuchal translucency/blood marker testing in the first trimester and quad screening in the second trimester.  We discussed that these are screening tests and not diagnostic tests and that false positives and negatives are a distinct possibility.  The patient and  will discuss and decide.  We discussed physician coverage, tertiary support, diet, exercise, weight gain, schedule of visits, routine and indicated ultrasounds, and childbirth education.   Labs done today  RTC 4 weeks

## 2018-09-05 NOTE — MR AVS SNAPSHOT
After Visit Summary   2018    Shanna Smith    MRN: [id number]           Patient Information     Date Of Birth          1990        Visit Information        Provider Department      2018 8:30 AM Alpesh Rodríguez MD [facility]        Today's Diagnoses     Current pregnancy in first trimester with history of spontaneous  during prior pregnancy    -  1    Pap smear for cervical cancer screening           Follow-ups after your visit        Follow-up notes from your care team     Return in about 4 weeks (around 10/3/2018) for prenatal visit.      Your next 10 appointments already scheduled     Oct 05, 2018 10:45 AM CDT   ESTABLISHED PRENATAL with Alpesh Rodríguez MD   [facility] ([address] [id number] [phone]              Who to contact     If you have questions or need follow up information about today's clinic visit or your schedule please contact [facility] directly at [phone].  Normal or non-critical lab and imaging results will be communicated to you by Ethical Electrichart, letter or phone within 4 business days after the clinic has received the results. If you do not hear from us within 7 days, please contact the clinic through [email or web address] or phone. If you have a critical or abnormal lab result, we will notify you by phone as soon as possible.  Submit refill requests through Appsperse or call your pharmacy and they will forward the refill request to us. Please allow 3 business days for your refill to be completed.          Additional Information About Your Visit        Ethical ElectricharSkybox Security Information     Appsperse gives you secure access to your electronic health record. If you see a primary care provider, you can also send messages to your care team and make appointments. If you have questions, please call your primary care clinic.  If you do not have a primary care provider, please call  [phone] and they will assist you.        Care EveryWhere ID     This is your Care EveryWhere ID. This could be used by other organizations to access your [facility] medical records  [id number]        Your Vitals Were     Pulse Last Period Pulse Oximetry Breastfeeding? BMI (Body Mass Index)       90 06/22/2018 (Exact Date) 97% No 29.35 kg/m2        Blood Pressure from Last 3 Encounters:   09/05/18 121/72   08/03/18 118/70   08/01/18 119/81    Weight from Last 3 Encounters:   09/05/18 193 lb (87.5 kg)   08/03/18 187 lb 12.8 oz (85.2 kg)   08/01/18 185 lb 9.6 oz (84.2 kg)              We Performed the Following     ABO/Rh type and screen     CBC with platelets differential     Hepatitis B surface antigen     HIV Antigen Antibody Combo     Pap imaged thin layer screen reflex to HPV if ASCUS - recommend age 25 - 29     Rubella Antibody IgG Quantitative     Treponema Abs w Reflex to RPR and Titer     Urine Culture Aerobic Bacterial        Primary Care Provider Office Phone # Fax #    Anastasiya Porter [phone] [address] [place] [address]        Equal Access to Services     FEDERICO HOFFMAN AH: Hadii juliann shaho Somehul, waaxda luqadaha, qaybta kaalmada adeegyada, eliezer dumont. So [facility] [phone].    ATENCIÓN: Si habla español, tiene a soto disposición servicios gratuitos de asistencia lingüística. Llame al [phone].    We comply with applicable federal civil rights laws and [place] laws. We do not discriminate on the basis of race, color, national origin, age, disability, sex, sexual orientation, or gender identity.            Thank you!     Thank you for choosing [facility]  for your care. Our goal is always to provide you with excellent care. Hearing back from our patients is one way we can continue to improve our services. Please take a few minutes to complete the written survey that you may receive in the mail after  your visit with us. Thank you!             Your Updated Medication List - Protect others around you: Learn how to safely use, store and throw away your medicines at www.disposemymeds.org.          This list is accurate as of 9/5/18  9:22 AM.  Always use your most recent med list.                   Brand Name Dispense Instructions for use Diagnosis    fluticasone 50 MCG/ACT spray    FLONASE    16 g    Spray 1-2 sprays into both nostrils daily    Seasonal allergic rhinitis       loratadine 10 MG tablet    CLARITIN    90 tablet    Take 1 tablet (10 mg) by mouth daily    Seasonal allergic rhinitis       prenatal multivitamin plus iron 27-0.8 MG Tabs per tablet     100 tablet    Take 1 tablet by mouth daily    Pregnancy test positive       TYLENOL 325 MG tablet   Generic drug:  acetaminophen      Take 325-650 mg by mouth every 6 hours as needed. As needed

## 2018-09-06 LAB
BACTERIA SPEC CULT: NORMAL
SPECIMEN SOURCE: NORMAL

## 2018-09-07 LAB
COPATH REPORT: NORMAL
PAP: NORMAL

## 2018-10-05 ENCOUNTER — PRENATAL OFFICE VISIT (OUTPATIENT)
Dept: OBGYN | Facility: CLINIC | Age: 28
End: 2018-10-05
Payer: COMMERCIAL

## 2018-10-05 VITALS
WEIGHT: 194 LBS | DIASTOLIC BLOOD PRESSURE: 80 MMHG | HEART RATE: 82 BPM | OXYGEN SATURATION: 96 % | SYSTOLIC BLOOD PRESSURE: 122 MMHG | BODY MASS INDEX: 29.5 KG/M2

## 2018-10-05 DIAGNOSIS — O09.292 CURRENT PREGNANCY IN SECOND TRIMESTER WITH HISTORY OF SPONTANEOUS ABORTION DURING PRIOR PREGNANCY: Primary | ICD-10-CM

## 2018-10-05 PROCEDURE — 99207 ZZC PRENATAL VISIT: CPT | Performed by: OBSTETRICS & GYNECOLOGY

## 2018-10-05 NOTE — MR AVS SNAPSHOT
After Visit Summary   10/5/2018    Shanna Smith    MRN: [id number]           Patient Information     Date Of Birth          1990        Visit Information        Provider Department      10/5/2018 10:45 AM Alpesh Rodríguez MD [facility]        Today's Diagnoses     Current pregnancy in second trimester with history of spontaneous  during prior pregnancy    -  1       Follow-ups after your visit        Follow-up notes from your care team     Return in about 5 weeks (around 2018) for prenatal visit.      Your next 10 appointments already scheduled     2018  7:30 AM CST   US OB > 14 WEEKS COMPLETE SINGLE with FKUS1   [facility] ([facility])    [address] [place] [address] [phone]           How do I prepare for my exam? (Food and drink instructions) Drink four 8-ounce glasses of fluid an hour before your exam. If you need to empty your bladder before your exam, try to release only a little urine. Then, drink another glass of fluid.  How do I prepare for my exam? (Other instructions) You may have up to two family members in the exam room. If you bring a small child, an adult must be there to care for him or her. No video or camera photography during the procedure.  What should I wear: Wear comfortable clothes.  How long does the exam take: Most ultrasounds take 30 to 60 minutes.  What should I bring: Bring a list of your medicines, including vitamins, minerals and over-the-counter drugs. It is safest to leave personal items at home.  Do I need a :  No  is needed.  What do I need to tell my doctor: Tell your doctor about any allergies you may have.  What should I do after the exam: No restrictions, You may resume normal activities.  What is this test: An ultrasound uses sound waves to make pictures of the body. Sound waves do not cause pain. The only discomfort may be the pressure of the wand  against your skin or full bladder.  Who should I call with questions: If you have any questions, please call the Imaging Department where you will have your exam. Directions, parking instructions, and other information is available on our website, [email or web address].            Nov 09, 2018  8:30 AM CST   ESTABLISHED PRENATAL with Alpesh Rodríguez MD   [facility] ([facility])    [address] [place] [address]  [place] [address] [phone]              Future tests that were ordered for you today     Open Future Orders        Priority Expected Expires Ordered    US OB > 14 Weeks Complete Single Routine  10/5/2019 10/5/2018            Who to contact     If you have questions or need follow up information about today's clinic visit or your schedule please contact [facility] directly at [phone].  Normal or non-critical lab and imaging results will be communicated to you by MyChart, letter or phone within 4 business days after the clinic has received the results. If you do not hear from us within 7 days, please contact the clinic through MyChart or phone. If you have a critical or abnormal lab result, we will notify you by phone as soon as possible.  Submit refill requests through WindGen Power Products or call your pharmacy and they will forward the refill request to us. Please allow 3 business days for your refill to be completed.          Additional Information About Your Visit        MyChart Information     [employer] gives you secure access to your electronic health record. If you see a primary care provider, you can also send messages to your care team and make appointments. If you have questions, please call your primary care clinic.  If you do not have a primary care provider, please call [phone] and they will assist you.        Care EveryWhere ID     This is your Care EveryWhere ID. This could be used by other organizations to access your [facility]  records  [id number]        Your Vitals Were     Pulse Last Period Pulse Oximetry BMI (Body Mass Index)          82 06/22/2018 (Exact Date) 96% 29.5 kg/m2         Blood Pressure from Last 3 Encounters:   10/05/18 122/80   09/05/18 121/72   08/03/18 118/70    Weight from Last 3 Encounters:   10/05/18 194 lb (88 kg)   09/05/18 193 lb (87.5 kg)   08/03/18 187 lb 12.8 oz (85.2 kg)               Primary Care Provider Office Phone # Fax #    Anastasiya Porter [phone] [address] [facility] [address]        Equal Access to Services     FEDERICO HOFFMAN : Jovani shaho Somehul, waaxda luqadaha, qaybta kaalmada adeegyada, eliezer dumont. So [facility] [phone].    ATENCIÓN: Si habla español, tiene a soto disposición servicios gratuitos de asistencia lingüística. [facility] [phone].    We comply with applicable federal civil rights laws and [place] laws. We do not discriminate on the basis of race, color, national origin, age, disability, sex, sexual orientation, or gender identity.            Thank you!     Thank you for choosing [facility]  for your care. Our goal is always to provide you with excellent care. Hearing back from our patients is one way we can continue to improve our services. Please take a few minutes to complete the written survey that you may receive in the mail after your visit with us. Thank you!             Your Updated Medication List - Protect others around you: Learn how to safely use, store and throw away your medicines at www.disposemymeds.org.          This list is accurate as of 10/5/18 11:17 AM.  Always use your most recent med list.                   Brand Name Dispense Instructions for use Diagnosis    fluticasone 50 MCG/ACT spray    FLONASE    16 g    Spray 1-2 sprays into both nostrils daily    Seasonal allergic rhinitis       loratadine 10 MG tablet    CLARITIN    90 tablet    Take 1 tablet (10 mg) by  mouth daily    Seasonal allergic rhinitis       prenatal multivitamin plus iron 27-0.8 MG Tabs per tablet     100 tablet    Take 1 tablet by mouth daily    Pregnancy test positive       TYLENOL 325 MG tablet   Generic drug:  acetaminophen      Take 325-650 mg by mouth every 6 hours as needed. As needed

## 2018-10-05 NOTE — PROGRESS NOTES
15w0d   Eating well.   Discussed genetic screening. Declines quad screen. Plan for 20wk US.   RTC 4 weeks  Alpesh Rodríguez MD

## 2018-10-22 ENCOUNTER — HEALTH MAINTENANCE LETTER (OUTPATIENT)
Age: 28
End: 2018-10-22

## 2018-11-09 ENCOUNTER — TELEPHONE (OUTPATIENT)
Dept: OBGYN | Facility: CLINIC | Age: 28
End: 2018-11-09

## 2018-11-09 ENCOUNTER — PRENATAL OFFICE VISIT (OUTPATIENT)
Dept: OBGYN | Facility: CLINIC | Age: 28
End: 2018-11-09
Payer: COMMERCIAL

## 2018-11-09 ENCOUNTER — RADIANT APPOINTMENT (OUTPATIENT)
Dept: ULTRASOUND IMAGING | Facility: CLINIC | Age: 28
End: 2018-11-09
Attending: OBSTETRICS & GYNECOLOGY
Payer: COMMERCIAL

## 2018-11-09 VITALS
WEIGHT: 199 LBS | OXYGEN SATURATION: 97 % | SYSTOLIC BLOOD PRESSURE: 121 MMHG | DIASTOLIC BLOOD PRESSURE: 76 MMHG | HEART RATE: 80 BPM | BODY MASS INDEX: 30.26 KG/M2

## 2018-11-09 DIAGNOSIS — O09.292 CURRENT PREGNANCY IN SECOND TRIMESTER WITH HISTORY OF SPONTANEOUS ABORTION DURING PRIOR PREGNANCY: Primary | ICD-10-CM

## 2018-11-09 DIAGNOSIS — O09.292 CURRENT PREGNANCY IN SECOND TRIMESTER WITH HISTORY OF SPONTANEOUS ABORTION DURING PRIOR PREGNANCY: ICD-10-CM

## 2018-11-09 PROCEDURE — 76805 OB US >/= 14 WKS SNGL FETUS: CPT

## 2018-11-09 PROCEDURE — 99207 ZZC PRENATAL VISIT: CPT | Performed by: OBSTETRICS & GYNECOLOGY

## 2018-11-09 NOTE — PROGRESS NOTES
20w0d.   Doing well without issues/concerns.    Quad screen not done per pt request.   Ultrasound performed today.   RTC 4 weeks  Alpesh Rodríguez MD

## 2018-11-14 ENCOUNTER — TELEPHONE (OUTPATIENT)
Dept: OBGYN | Facility: CLINIC | Age: 28
End: 2018-11-14

## 2018-11-14 NOTE — TELEPHONE ENCOUNTER
Reason for call:  Patient reporting a symptom    Symptom or request: Back pain- 20 1/2 weeks pregnant    Duration (how long have symptoms been present): off and on for 2 months and slowly getting to be more often    Have you been treated for this before? No    Additional comments: thinks it's due to the pregnancy and wonders if she should go to a chiropractor or do some exercises? Please call to advise. Thank you.    Phone Number patient can be reached at:  Home number on file [phone] (home)    Best Time:  any    Can we leave a detailed message on this number:  NO    Call taken on 11/14/2018 at 10:07 AM by Melissa El

## 2018-11-14 NOTE — TELEPHONE ENCOUNTER
Spoke with pt.  She states she had back pain with her first pregnancy but this pregnancy she feels it has started earlier in the pregnancy.  She states the pain is in her low back on the left side.  Denies any pain that wraps around to her stomach, denies pain down her leg, denies pain coming in rhythmic intervals, denies fever or nausea.  Pt states sitting down decreases her back pain.  Certain movements like bending over to pick things up and when she turns a certain way or when walking she notices a sharp pain.  Pt does YOGA.  Explained to pt that it sounds like she may be experiencing some nerve pain or back pain from the joints in her pelvis relaxing.  Reminded her to wear comfortable shoes and maintain good posture throughout the day.  Explained that stretching and exercising daily is beneficial so continue to do YOGA.  I also suggested she get some books for other stretches and exercises to do during pregnancy.  I advised her to call back if her back pain increases, she feels pain down a leg or it wraps around to her belly or if the back pain comes in rhythmic intervals.  I also suggested she use Tylenol to help with the pain as well and to make sure she drinks plenty of water while doing YOGA or exercising.  Pt verbalized understanding and agreed to plan.    Janie Yoder RN

## 2018-12-07 ENCOUNTER — PRENATAL OFFICE VISIT (OUTPATIENT)
Dept: OBGYN | Facility: CLINIC | Age: 28
End: 2018-12-07
Payer: COMMERCIAL

## 2018-12-07 VITALS
BODY MASS INDEX: 31.26 KG/M2 | HEART RATE: 94 BPM | SYSTOLIC BLOOD PRESSURE: 124 MMHG | OXYGEN SATURATION: 97 % | WEIGHT: 205.6 LBS | DIASTOLIC BLOOD PRESSURE: 81 MMHG

## 2018-12-07 DIAGNOSIS — O09.292 CURRENT PREGNANCY IN SECOND TRIMESTER WITH HISTORY OF SPONTANEOUS ABORTION DURING PRIOR PREGNANCY: Primary | ICD-10-CM

## 2018-12-07 LAB
GLUCOSE 1H P 50 G GLC PO SERPL-MCNC: 106 MG/DL (ref 60–129)
HGB BLD-MCNC: 11.1 G/DL (ref 11.7–15.7)

## 2018-12-07 PROCEDURE — 85018 HEMOGLOBIN: CPT | Performed by: OBSTETRICS & GYNECOLOGY

## 2018-12-07 PROCEDURE — 36415 COLL VENOUS BLD VENIPUNCTURE: CPT | Performed by: OBSTETRICS & GYNECOLOGY

## 2018-12-07 PROCEDURE — 99207 ZZC PRENATAL VISIT: CPT | Performed by: OBSTETRICS & GYNECOLOGY

## 2018-12-07 PROCEDURE — 82950 GLUCOSE TEST: CPT | Performed by: OBSTETRICS & GYNECOLOGY

## 2018-12-07 NOTE — MR AVS SNAPSHOT
After Visit Summary   12/7/2018    Shanna Smith    MRN: [id number]           Patient Information     Date Of Birth          1990        Visit Information        Provider Department      12/7/2018 9:30 AM Alpesh Rodríguez MD [facility]        Care Instructions                                                        If you have any questions regarding your visit, Please contact your care team.    Women s Health CLINIC HOURS TELEPHONE NUMBER   MD Stacey Crane MA Lisa -        [place]       Tuesday:       7:30-3:30 [place]  Wednesday:       8:00-4:30 [place]  Thursday:       8:00-4:00 [place]  Friday:       7:30-12:30 [facility]  [address]  [place] [address]  [phone] ask for Women's Clinic    [facility]  [address] [place] [address]  [phone] ask for [facility]    Imaging Scheduling for [place]:  [phone]    Imaging Scheduling for [place]: [phone]       Urgent Care locations:    [facility] Saturday and Sunday   9 am - 5 pm    Monday-Friday   12 pm - 8 pm  Saturday and Sunday   9 am - 5 pm   [phone]     [facility] Labor and Delivery:  [phone]    If you need a medication refill, please contact your pharmacy. Please allow 3 business days for your refill to be completed.  As always, Thank you for trusting us with your healthcare needs!              Follow-ups after your visit        Your next 10 appointments already scheduled     Jan 04, 2019 10:30 AM CST   ESTABLISHED PRENATAL with Alpesh Rodríguez MD   [facility] ([facility])    [address] [facility]  [place] [address] [phone]              Who to contact     If you have questions or need follow up information about today's clinic visit or your schedule please contact [facility] directly  at [phone].  Normal or non-critical lab and imaging results will be communicated to you by MyChart, letter or phone within 4 business days after the clinic has received the results. If you do not hear from us within 7 days, please contact the clinic through Energy Pointshart or phone. If you have a critical or abnormal lab result, we will notify you by phone as soon as possible.  Submit refill requests through HigherNext or call your pharmacy and they will forward the refill request to us. Please allow 3 business days for your refill to be completed.          Additional Information About Your Visit        Energy Pointshart Information     HigherNext gives you secure access to your electronic health record. If you see a primary care provider, you can also send messages to your care team and make appointments. If you have questions, please call your primary care clinic.  If you do not have a primary care provider, please call [phone] and they will assist you.        Care EveryWhere ID     This is your Care EveryWhere ID. This could be used by other organizations to access your [facility] medical records  [id number]        Your Vitals Were     Pulse Last Period Pulse Oximetry BMI (Body Mass Index)          94 06/22/2018 (Exact Date) 97% 31.26 kg/m2         Blood Pressure from Last 3 Encounters:   12/07/18 124/81   11/09/18 121/76   10/05/18 122/80    Weight from Last 3 Encounters:   12/07/18 205 lb 9.6 oz (93.3 kg)   11/09/18 199 lb (90.3 kg)   10/05/18 194 lb (88 kg)              Today, you had the following     No orders found for display       Primary Care Provider Office Phone # Fax #    Anastasiya Porter [phone] [address] [facility] [address]        Equal Access to Services     FEDERICO HOFFMAN : Jovani Cobb, nell bradley, eliezer vu. So [facility] [phone].    ATENCIÓN: Si habla español, tiene a soto disposición  servicios gratuitos de asistencia lingüística. Delia rutherford [phone].    We comply with applicable federal civil rights laws and [place] laws. We do not discriminate on the basis of race, color, national origin, age, disability, sex, sexual orientation, or gender identity.            Thank you!     Thank you for choosing [facility]  for your care. Our goal is always to provide you with excellent care. Hearing back from our patients is one way we can continue to improve our services. Please take a few minutes to complete the written survey that you may receive in the mail after your visit with us. Thank you!             Your Updated Medication List - Protect others around you: Learn how to safely use, store and throw away your medicines at www.disposemymeds.org.          This list is accurate as of 12/7/18  9:39 AM.  Always use your most recent med list.                   Brand Name Dispense Instructions for use Diagnosis    fluticasone 50 MCG/ACT nasal spray    FLONASE    16 g    Spray 1-2 sprays into both nostrils daily    Seasonal allergic rhinitis       loratadine 10 MG tablet    CLARITIN    90 tablet    Take 1 tablet (10 mg) by mouth daily    Seasonal allergic rhinitis       prenatal multivitamin w/iron 27-0.8 MG tablet     100 tablet    Take 1 tablet by mouth daily    Pregnancy test positive       TYLENOL 325 MG tablet   Generic drug:  acetaminophen      Take 325-650 mg by mouth every 6 hours as needed. As needed

## 2018-12-07 NOTE — PROGRESS NOTES
24w0d    Doing well without issues/concerns.    US did not show any anomalies  Labs today   RTC 4wk.   Alpesh Rodríguez MD

## 2018-12-07 NOTE — PATIENT INSTRUCTIONS
If you have any questions regarding your visit, Please contact your care team.    Women s Health CLINIC HOURS TELEPHONE NUMBER   MD Stacey Crane MA Lisa -        [place]       Tuesday:       7:30-3:30 [place]  Wednesday:       8:00-4:30 [place]  Thursday:       8:00-4:00 [place]  Friday:       7:30-12:30 [facility]  [address] [place] [address]  [phone] ask for Women's Clinic    [facility]  [address] [place] [address]  [phone] ask for [facility]    Imaging Scheduling for [place]:  [phone]    Imaging Scheduling for [place]: [phone]       Urgent Care locations:    [facility] Saturday and Sunday   9 am - 5 pm    Monday-Friday   12 pm - 8 pm  Saturday and Sunday   9 am - 5 pm   [phone]     [facility] Labor and Delivery:  [phone]    If you need a medication refill, please contact your pharmacy. Please allow 3 business days for your refill to be completed.  As always, Thank you for trusting us with your healthcare needs!

## 2019-01-04 ENCOUNTER — PRENATAL OFFICE VISIT (OUTPATIENT)
Dept: OBGYN | Facility: CLINIC | Age: 29
End: 2019-01-04
Payer: COMMERCIAL

## 2019-01-04 VITALS
BODY MASS INDEX: 32.57 KG/M2 | WEIGHT: 214.2 LBS | HEART RATE: 104 BPM | OXYGEN SATURATION: 96 % | DIASTOLIC BLOOD PRESSURE: 81 MMHG | SYSTOLIC BLOOD PRESSURE: 131 MMHG

## 2019-01-04 DIAGNOSIS — O99.012 ANEMIA AFFECTING PREGNANCY IN SECOND TRIMESTER: ICD-10-CM

## 2019-01-04 DIAGNOSIS — O09.292 CURRENT PREGNANCY IN SECOND TRIMESTER WITH HISTORY OF SPONTANEOUS ABORTION DURING PRIOR PREGNANCY: Primary | ICD-10-CM

## 2019-01-04 PROCEDURE — 99207 ZZC PRENATAL VISIT: CPT | Performed by: OBSTETRICS & GYNECOLOGY

## 2019-01-04 RX ORDER — FERROUS SULFATE 325(65) MG
325 TABLET ORAL
Qty: 100 TABLET | Refills: 1 | Status: SHIPPED | OUTPATIENT
Start: 2019-01-04 | End: 2020-02-12

## 2019-01-04 NOTE — PROGRESS NOTES
28w0d   Doing well.  No problems.   She has had some supra pubic pain and wondering about the pregnancy belt.   She would like the Fe prescribed.   RTC 2wk.

## 2019-01-18 ENCOUNTER — PRENATAL OFFICE VISIT (OUTPATIENT)
Dept: OBGYN | Facility: CLINIC | Age: 29
End: 2019-01-18
Payer: COMMERCIAL

## 2019-01-18 VITALS
OXYGEN SATURATION: 97 % | DIASTOLIC BLOOD PRESSURE: 86 MMHG | WEIGHT: 217 LBS | HEART RATE: 96 BPM | SYSTOLIC BLOOD PRESSURE: 135 MMHG | BODY MASS INDEX: 32.99 KG/M2

## 2019-01-18 DIAGNOSIS — O09.293 PREGNANCY WITH HISTORY OF MISCARRIAGE, THIRD TRIMESTER: Primary | ICD-10-CM

## 2019-01-18 PROCEDURE — 99207 ZZC PRENATAL VISIT: CPT | Performed by: OBSTETRICS & GYNECOLOGY

## 2019-02-08 ENCOUNTER — PRENATAL OFFICE VISIT (OUTPATIENT)
Dept: OBGYN | Facility: CLINIC | Age: 29
End: 2019-02-08
Payer: COMMERCIAL

## 2019-02-08 VITALS
WEIGHT: 222 LBS | SYSTOLIC BLOOD PRESSURE: 118 MMHG | DIASTOLIC BLOOD PRESSURE: 78 MMHG | BODY MASS INDEX: 33.75 KG/M2 | OXYGEN SATURATION: 97 % | HEART RATE: 89 BPM

## 2019-02-08 DIAGNOSIS — Z23 NEED FOR DIPHTHERIA-TETANUS-PERTUSSIS (TDAP) VACCINE: ICD-10-CM

## 2019-02-08 DIAGNOSIS — O99.013 ANEMIA AFFECTING PREGNANCY IN THIRD TRIMESTER: ICD-10-CM

## 2019-02-08 DIAGNOSIS — O09.293 PREGNANCY WITH HISTORY OF MISCARRIAGE, THIRD TRIMESTER: Primary | ICD-10-CM

## 2019-02-08 PROCEDURE — 90715 TDAP VACCINE 7 YRS/> IM: CPT | Performed by: OBSTETRICS & GYNECOLOGY

## 2019-02-08 PROCEDURE — 90471 IMMUNIZATION ADMIN: CPT | Performed by: OBSTETRICS & GYNECOLOGY

## 2019-02-08 PROCEDURE — 99207 ZZC PRENATAL VISIT: CPT | Performed by: OBSTETRICS & GYNECOLOGY

## 2019-02-08 NOTE — NURSING NOTE
Screening Questionnaire for Adult Immunization    Are you sick today?   No   Do you have allergies to medications, food, a vaccine component or latex?   No   Have you ever had a serious reaction after receiving a vaccination?   No   Do you have a long-term health problem with heart disease, lung disease, asthma, kidney disease, metabolic disease (e.g. diabetes), anemia, or other blood disorder?   No   Do you have cancer, leukemia, HIV/AIDS, or any other immune system problem?   No   In the past 3 months, have you taken medications that affect  your immune system, such as prednisone, other steroids, or anticancer drugs; drugs for the treatment of rheumatoid arthritis, Crohn s disease, or psoriasis; or have you had radiation treatments?   No   Have you had a seizure, or a brain or other nervous system problem?   No   During the past year, have you received a transfusion of blood or blood     products, or been given immune (gamma) globulin or antiviral drug?   No   For women: Are you pregnant or is there a chance you could become        pregnant during the next month?   Yes   Have you received any vaccinations in the past 4 weeks?   No     Immunization questionnaire was positive for at least one answer.  Notified [facility].        Per orders of Dr. Rodríguez, injection of Tdap given by Stacey Perrin. Patient instructed to remain in clinic for 15 minutes afterwards, and to report any adverse reaction to me immediately.       Screening performed by Stacey Perrin on 2/8/2019 at 11:15 AM.

## 2019-02-08 NOTE — PROGRESS NOTES
TDAP Vaccine (Adacel)     Date Status Dose VIS Date Route Site  Lot# Given By Verified By   2/8/2019 Given 0.5 mL 02/24/2015, Given Today IM LD [employer] [id number] Stacey Perrin CMA --   Exp. Date NDC # Product Time Location External Comment   10/10/2020 07756-809-44 ADACEL --  --    Updated by: Stacey Perrin CMA on 2/8/2019 11:14 AM

## 2019-02-22 ENCOUNTER — PRENATAL OFFICE VISIT (OUTPATIENT)
Dept: OBGYN | Facility: CLINIC | Age: 29
End: 2019-02-22
Payer: COMMERCIAL

## 2019-02-22 VITALS
DIASTOLIC BLOOD PRESSURE: 85 MMHG | OXYGEN SATURATION: 96 % | WEIGHT: 222 LBS | HEART RATE: 71 BPM | SYSTOLIC BLOOD PRESSURE: 125 MMHG | BODY MASS INDEX: 33.75 KG/M2

## 2019-02-22 DIAGNOSIS — O09.293 PREGNANCY WITH HISTORY OF MISCARRIAGE, THIRD TRIMESTER: Primary | ICD-10-CM

## 2019-02-22 DIAGNOSIS — O99.013 ANEMIA AFFECTING PREGNANCY IN THIRD TRIMESTER: ICD-10-CM

## 2019-02-22 PROCEDURE — 99207 ZZC PRENATAL VISIT: CPT | Performed by: OBSTETRICS & GYNECOLOGY

## 2019-02-22 NOTE — PROGRESS NOTES
35w0d    No HA, visual changes, N/V   Labor plan and warning s/s discussed.   RTC 1 wk with beta strep at next visit.   Alpesh Rodríguez MD

## 2019-03-05 ENCOUNTER — PRENATAL OFFICE VISIT (OUTPATIENT)
Dept: OBGYN | Facility: CLINIC | Age: 29
End: 2019-03-05
Payer: COMMERCIAL

## 2019-03-05 VITALS
OXYGEN SATURATION: 95 % | SYSTOLIC BLOOD PRESSURE: 128 MMHG | HEART RATE: 94 BPM | WEIGHT: 231.4 LBS | BODY MASS INDEX: 35.18 KG/M2 | DIASTOLIC BLOOD PRESSURE: 84 MMHG

## 2019-03-05 DIAGNOSIS — O09.293 CURRENT PREGNANCY IN THIRD TRIMESTER WITH HISTORY OF SPONTANEOUS ABORTION DURING PRIOR PREGNANCY: Primary | ICD-10-CM

## 2019-03-05 DIAGNOSIS — Z36.85 ANTENATAL SCREENING FOR STREPTOCOCCUS B: ICD-10-CM

## 2019-03-05 PROCEDURE — 99207 ZZC PRENATAL VISIT: CPT | Performed by: OBSTETRICS & GYNECOLOGY

## 2019-03-05 PROCEDURE — 87653 STREP B DNA AMP PROBE: CPT | Performed by: OBSTETRICS & GYNECOLOGY

## 2019-03-05 NOTE — PROGRESS NOTES
36w4d    No HA, visual changes, N/V   Labor plan and warning s/s discussed.   GBS performed today.  RTC 1 wk  Alpesh Rodríguez MD

## 2019-03-06 LAB
GP B STREP DNA SPEC QL NAA+PROBE: NEGATIVE
SPECIMEN SOURCE: NORMAL

## 2019-03-15 ENCOUNTER — PRENATAL OFFICE VISIT (OUTPATIENT)
Dept: OBGYN | Facility: CLINIC | Age: 29
End: 2019-03-15
Payer: COMMERCIAL

## 2019-03-15 VITALS
DIASTOLIC BLOOD PRESSURE: 82 MMHG | BODY MASS INDEX: 34.97 KG/M2 | OXYGEN SATURATION: 95 % | HEART RATE: 81 BPM | WEIGHT: 230 LBS | SYSTOLIC BLOOD PRESSURE: 130 MMHG

## 2019-03-15 DIAGNOSIS — O09.293 CURRENT PREGNANCY IN THIRD TRIMESTER WITH HISTORY OF SPONTANEOUS ABORTION DURING PRIOR PREGNANCY: Primary | ICD-10-CM

## 2019-03-15 DIAGNOSIS — O99.013 ANEMIA AFFECTING PREGNANCY IN THIRD TRIMESTER: ICD-10-CM

## 2019-03-15 PROCEDURE — 99207 ZZC PRENATAL VISIT: CPT | Performed by: OBSTETRICS & GYNECOLOGY

## 2019-03-15 NOTE — PROGRESS NOTES
38w0d    No HA, visual changes, N/V   Labor plan and warning s/s discussed.   GBS negative   RTC 1 wk

## 2019-03-22 ENCOUNTER — PRENATAL OFFICE VISIT (OUTPATIENT)
Dept: OBGYN | Facility: CLINIC | Age: 29
End: 2019-03-22
Payer: COMMERCIAL

## 2019-03-22 VITALS
BODY MASS INDEX: 35.58 KG/M2 | DIASTOLIC BLOOD PRESSURE: 80 MMHG | HEART RATE: 97 BPM | WEIGHT: 234 LBS | OXYGEN SATURATION: 95 % | SYSTOLIC BLOOD PRESSURE: 123 MMHG

## 2019-03-22 DIAGNOSIS — O99.013 ANEMIA AFFECTING PREGNANCY IN THIRD TRIMESTER: ICD-10-CM

## 2019-03-22 DIAGNOSIS — O09.293 CURRENT PREGNANCY IN THIRD TRIMESTER WITH HISTORY OF SPONTANEOUS ABORTION DURING PRIOR PREGNANCY: Primary | ICD-10-CM

## 2019-03-22 PROCEDURE — 99207 ZZC PRENATAL VISIT: CPT | Performed by: OBSTETRICS & GYNECOLOGY

## 2019-03-22 NOTE — PROGRESS NOTES
39w0d    No HA, visual changes, N/V   Labor plan and warning s/s discussed.   RTC 1 wk  Alpesh Rodríguez MD

## 2019-03-29 ENCOUNTER — PRENATAL OFFICE VISIT (OUTPATIENT)
Dept: OBGYN | Facility: CLINIC | Age: 29
End: 2019-03-29
Payer: COMMERCIAL

## 2019-03-29 DIAGNOSIS — O48.0 POST-TERM PREGNANCY, 40-42 WEEKS OF GESTATION: Primary | ICD-10-CM

## 2019-03-29 PROCEDURE — 99207 ZZC PRENATAL VISIT: CPT | Performed by: OBSTETRICS & GYNECOLOGY

## 2019-03-29 NOTE — PROGRESS NOTES
40w0d    No leakage of fluid.    Denies HA, visual changes   Discussed labor plan as well as cervical ripening/induction options.  Induction is scheduled for 04/04/2019.   Reviewed when to call.     BPP/NST ordered.  Alpesh Rodríguez MD

## 2019-04-01 ENCOUNTER — NURSE TRIAGE (OUTPATIENT)
Dept: NURSING | Facility: CLINIC | Age: 29
End: 2019-04-01

## 2019-04-02 VITALS
OXYGEN SATURATION: 96 % | BODY MASS INDEX: 35.76 KG/M2 | SYSTOLIC BLOOD PRESSURE: 131 MMHG | WEIGHT: 235.2 LBS | HEART RATE: 64 BPM | DIASTOLIC BLOOD PRESSURE: 81 MMHG

## 2019-04-02 NOTE — TELEPHONE ENCOUNTER
Pt reports non-consistent contractions all day which subsided 2 hours ago.  For the past 2 hours she has had increased pelvic pressure and mild constant abdominal pain/cramping.  She also reports vaginal bleeding with wiping.      Disposition:  Page provider    10:00PM:  Smart Web used to page on-call OB Dr. Ron Ramirez to call pt directly at [phone].  Advised pt to call FNA back if no response from the doctor within 15 minutes.  She verbalized understanding and had no further questions.     Tiffany De León, RN/TRE    Reason for Disposition    Abdominal pain or having contractions    Additional Information    Negative: [1] Vaginal bleeding AND [2] pregnant < 20 weeks    Negative: Passed out (i.e., lost consciousness, collapsed and was not responding)    Negative: Shock suspected (e.g., cold/pale/clammy skin, too weak to stand, low BP, rapid pulse)    Negative: Difficult to awaken or acting confused  (e.g., disoriented, slurred speech)    Negative: SEVERE vaginal bleeding (e.g., continuous red blood from vagina, large blood clots)    Negative: [1] SEVERE abdominal pain (e.g., excruciating) AND [2] constant AND [3] present > 1 hour    Negative: Sounds like a life-threatening emergency to the triager    Negative: MILD-MODERATE vaginal bleeding (i.e., small to medium clots; like mild menstrual period)    Protocols used: PREGNANCY - VAGINAL BLEEDING GREATER THAN 20 WEEKS [facility]-ADULT-

## 2019-04-05 DIAGNOSIS — Z78.9 BREASTFEEDING (INFANT): Primary | ICD-10-CM

## 2019-05-01 ENCOUNTER — OFFICE VISIT (OUTPATIENT)
Dept: FAMILY MEDICINE | Facility: CLINIC | Age: 29
End: 2019-05-01
Payer: COMMERCIAL

## 2019-05-01 VITALS
OXYGEN SATURATION: 95 % | HEART RATE: 95 BPM | BODY MASS INDEX: 30.89 KG/M2 | WEIGHT: 203.8 LBS | DIASTOLIC BLOOD PRESSURE: 70 MMHG | TEMPERATURE: 98.1 F | HEIGHT: 68 IN | SYSTOLIC BLOOD PRESSURE: 130 MMHG

## 2019-05-01 DIAGNOSIS — J02.9 ACUTE PHARYNGITIS, UNSPECIFIED ETIOLOGY: Primary | ICD-10-CM

## 2019-05-01 DIAGNOSIS — J30.1 ALLERGIC RHINITIS DUE TO POLLEN, UNSPECIFIED SEASONALITY: ICD-10-CM

## 2019-05-01 LAB
DEPRECATED S PYO AG THROAT QL EIA: NORMAL
SPECIMEN SOURCE: NORMAL

## 2019-05-01 PROCEDURE — 87081 CULTURE SCREEN ONLY: CPT | Performed by: FAMILY MEDICINE

## 2019-05-01 PROCEDURE — 99213 OFFICE O/P EST LOW 20 MIN: CPT | Performed by: FAMILY MEDICINE

## 2019-05-01 PROCEDURE — 87880 STREP A ASSAY W/OPTIC: CPT | Performed by: FAMILY MEDICINE

## 2019-05-01 RX ORDER — LORATADINE 10 MG/1
10 TABLET ORAL DAILY
Qty: 90 TABLET | Refills: 3 | Status: SHIPPED | OUTPATIENT
Start: 2019-05-01 | End: 2021-01-12

## 2019-05-01 ASSESSMENT — MIFFLIN-ST. JEOR: SCORE: 1697.93

## 2019-05-01 ASSESSMENT — PAIN SCALES - GENERAL: PAINLEVEL: NO PAIN (0)

## 2019-05-01 NOTE — PATIENT INSTRUCTIONS
Tylenol or Ibuprofen for symptoms.    Tea and Honey    OTC decongestants are ok except those that have the D or psuedafed     Orders Placed This Encounter     loratadine (CLARITIN) 10 MG tablet     Sig: Take 1 tablet (10 mg) by mouth daily     Dispense:  90 tablet     Refill:  3     [facility]     Discharged by : BM  Paper scripts provided to patient : NO     If you have any questions regarding your visit please contact your care team:     Team Gold                Clinic Hours Telephone Number     Dr. Ron Fay, CNP   7am-7pm  Monday - Thursday   7am-5pm  Fridays  [phone]   (Appointment scheduling available 24/7)     RN Line  [phone] option 2     Urgent Care - [place] and [place] - 11am-9pm Monday-Friday Saturday-Sunday- 9am-5pm     [place] -   5pm-9pm Monday-Friday Saturday-Sunday- 9am-5pm    [phone] - [place]    [phone] - [place]     For a Price Quote for your services, please call our Consumer Price Line at [phone].     What options do I have for visits at the clinic other than the traditional office visit?     To expand how we care for you, many of our providers are utilizing electronic visits (e-visits) and telephone visits, when medically appropriate, for interactions with their patients rather than a visit in the clinic. We also offer nurse visits for many medical concerns. Just like any other service, we will bill your insurance company for this type of visit based on time spent on the phone with your provider. Not all insurance companies cover these visits. Please check with your medical insurance if this type of visit is covered. You will be responsible for any charges that are not paid by your insurance.     E-visits via [employer]: generally incur a $45.00 fee.     Telephone visits:  Time spent on the phone: *charged based on time that is spent on the phone in increments of 10 minutes.  Estimated cost:   5-10 mins $30.00   11-20 mins. $59.00   21-30 mins. $85.00       Use GodTube (secure email communication and access to your chart) to send your primary care provider a message or make an appointment. Ask someone on your Team how to sign up for GodTube.     As always, Thank you for trusting us with your health care needs!      [facility]:    Scheduling Appointments  [facility]  Call: [phone]    [facility]  Call: [phone]    [facility]  Call: [phone]    For Gastroenterology referrals   [facility] Gastroenterology   Clinics and Surgery Center, 4th Floor   [address] [place] [address]   Appointments: [phone]    WHERE TO GO FOR CARE?    Clinic    Make an appointment if you:       Are sick (cold, cough, flu, sore throat, earache or in pain).       Have a small injury (sprain, small cut, burn or broken bone).       Need a physical exam, Pap smear, vaccine or prescription refill.       Have questions about your health or medicines.    To reach us:      Call [phone] ([phone]). Open 24 hours every day. (For counseling services, call [phone].)    Log into GodTube at [email or web address]. (Visit "[email or web address] to create an account.) Hospital emergency room    An emergency is a serious or life- threatening problem that must be treated right away.    Call 225 or get to the hospital if you have:      Very bad or sudden:            - Chest pain or pressure         - Bleeding         - Head or belly pain         - Dizziness or trouble seeing, walking or                          Speaking      Problems breathing      Blood in your vomit or you are coughing up blood      A major injury (knocked out, loss of a finger or limb, rape, broken bone protruding from skin)    A mental health crisis. (Or call the Mental Health Crisis line at 1-838.875.5153 or Suicide Prevention  Hotline at 1-747.408.3663.)    Open 24 hours every day. You don't need an appointment.     Urgent care    Visit urgent care for sickness or small injuries when the clinic is closed. You don't need an appointment. To check hours or find an urgent care near you, visit [email or web address]. Online care    Get online care from [facility] for more than 70 common problems, like colds, allergies and infections. Open 24 hours every day at:   [email or web address]   Need help deciding?    For advice about where to be seen, you may call your clinic and ask to speak with a nurse. We're here for you 24 hours every day.         If you are deaf or hard of hearing, please let us know. We provide many free services including sign language interpreters, oral interpreters, TTYs, telephone amplifiers, note takers and written materials.

## 2019-05-01 NOTE — PROGRESS NOTES
"  SUBJECTIVE:   Shanna Smiht is a 29 year old female who presents to clinic today for the following   health issues:      ENT Symptoms             Symptoms: cc Present Absent Comment   Fever/Chills   x    Fatigue  x     Muscle Aches   x    Eye Irritation  x     Sneezing  x     Nasal Jonny/Drg  x     Sinus Pressure/Pain  x     Loss of smell   x    Dental pain   x    Sore Throat   x driness    Swollen Glands  x     Ear Pain/Fullness  x  Right ear itchy   Cough   x    Wheeze   x    Chest Pain   x    Shortness of breath   x    Rash   x    Other   x      Symptom duration:  2 days   Symptom severity:  mild/mod   Treatments tried:  tylenol    Contacts:  daughter - vomiting on 4/29/2019             Additional history: as documented    Reviewed  and updated as needed this visit by clinical staff         Reviewed and updated as needed this visit by Provider         BP Readings from Last 3 Encounters:   05/01/19 130/70   03/29/19 131/81   03/22/19 123/80    Wt Readings from Last 3 Encounters:   05/01/19 92.4 kg (203 lb 12.8 oz)   03/29/19 106.7 kg (235 lb 3.2 oz)   03/22/19 106.1 kg (234 lb)                    ROS:  Constitutional, HEENT, cardiovascular, pulmonary, gi and gu systems are negative, except as otherwise noted.    OBJECTIVE:     /70 (BP Location: Right arm, Patient Position: Chair, Cuff Size: Adult Large)   Pulse 95   Temp 98.1  F (36.7  C) (Oral)   Ht 1.727 m (5' 8\")   Wt 92.4 kg (203 lb 12.8 oz)   LMP 06/22/2018 (Exact Date)   SpO2 95%   BMI 30.99 kg/m    Body mass index is 30.99 kg/m .   GENERAL: alert, no distress and fatigued  HENT: normal cephalic/atraumatic, ear canals and TM's normal, nose and mouth without ulcers or lesions, nasal mucosa edematous , oropharynx clear, oral mucous membranes moist and mild post pharynx erythema. Right submandibular tender lymph node.   NECK: no adenopathy, no asymmetry, masses, or scars and thyroid normal to palpation  RESP: lungs clear to auscultation - no " rales, rhonchi or wheezes  CV: regular rate and rhythm, normal S1 S2, no S3 or S4, no murmur, click or rub, no peripheral edema and peripheral pulses strong  ABDOMEN: soft, nontender, no hepatosplenomegaly, no masses and bowel sounds normal  MS: no gross musculoskeletal defects noted, no edema  SKIN: no suspicious lesions or rashes      Diagnostic Test Results:  Results for orders placed or performed in visit on 05/01/19 (from the past 24 hour(s))   Rapid strep screen   Result Value Ref Range    Specimen Description Throat     Rapid Strep A Screen       NEGATIVE: No Group A streptococcal antigen detected by immunoassay, await culture report.       ASSESSMENT:       PLAN:   (J02.9) Acute pharyngitis, unspecified etiology  (primary encounter diagnosis)  Comment: she does have a hx of allergies but has tender lymphadenopathy today. Probable viral illness  Plan: Rapid strep screen, Beta strep group A culture            (J30.1) Allergic rhinitis due to pollen, unspecified seasonality  Comment: hx of allergies especially in the spring  Plan: loratadine (CLARITIN) 10 MG tablet                  Patient Instructions     Tylenol or Ibuprofen for symptoms.    Tea and Honey    OTC decongestants are ok except those that have the D or psuedafed     Orders Placed This Encounter     loratadine (CLARITIN) 10 MG tablet     Sig: Take 1 tablet (10 mg) by mouth daily     Dispense:  90 tablet     Refill:  3     [facility]     Discharged by : BM  Paper scripts provided to patient : NO     If you have any questions regarding your visit please contact your care team:     Team Gold                Clinic Hours Telephone Number     Dr. Ron Fay, CHERISE   7am-7pm  Monday - Thursday   7am-5pm  Fridays  [phone]   (Appointment scheduling available 24/7)     RN Line  [phone] option 2     Urgent Care - [place] and [facility] - 11am-9pm  Monday-Friday Saturday-Sunday- 9am-5pm     [place] -   5pm-9pm Monday-Friday Saturday-Sunday- 9am-5pm    [phone] - [place]    [phone] - [place]     For a Price Quote for your services, please call our Consumer Price Line at [phone].     What options do I have for visits at the clinic other than the traditional office visit?     To expand how we care for you, many of our providers are utilizing electronic visits (e-visits) and telephone visits, when medically appropriate, for interactions with their patients rather than a visit in the clinic. We also offer nurse visits for many medical concerns. Just like any other service, we will bill your insurance company for this type of visit based on time spent on the phone with your provider. Not all insurance companies cover these visits. Please check with your medical insurance if this type of visit is covered. You will be responsible for any charges that are not paid by your insurance.     E-visits via [employer]: generally incur a $45.00 fee.     Telephone visits:  Time spent on the phone: *charged based on time that is spent on the phone in increments of 10 minutes. Estimated cost:   5-10 mins $30.00   11-20 mins. $59.00   21-30 mins. $85.00       Use [employer] (secure email communication and access to your chart) to send your primary care provider a message or make an appointment. Ask someone on your Team how to sign up for [employer].     As always, Thank you for trusting us with your health care needs!      [facility]:    Scheduling Appointments  [facility] [place]  Call: [phone]    [facility]  Call: [phone]    [facility]  Call: [phone]    For Gastroenterology referrals   [facility] Gastroenterology   Clinics and Surgery [place], 4th Floor   [address] [place] [address]   Appointments: [phone]    WHERE TO GO FOR  CARE?    Clinic    Make an appointment if you:       Are sick (cold, cough, flu, sore throat, earache or in pain).       Have a small injury (sprain, small cut, burn or broken bone).       Need a physical exam, Pap smear, vaccine or prescription refill.       Have questions about your health or medicines.    To reach us:      Call [phone] ([phone]). Open 24 hours every day. (For counseling services, call [phone].)    Log into GROUNDBOOTH at [employer]. (Visit myDocket[email or web address] to create an account.) Hospital emergency room    An emergency is a serious or life- threatening problem that must be treated right away.    Call 148 or get to the hospital if you have:      Very bad or sudden:            - Chest pain or pressure         - Bleeding         - Head or belly pain         - Dizziness or trouble seeing, walking or                          Speaking      Problems breathing      Blood in your vomit or you are coughing up blood      A major injury (knocked out, loss of a finger or limb, rape, broken bone protruding from skin)    A mental health crisis. (Or call the Mental Health Crisis line at 1-214.155.5255 or Suicide Prevention Hotline at 1-169.693.2145.)    Open 24 hours every day. You don't need an appointment.     Urgent care    Visit urgent care for sickness or small injuries when the clinic is closed. You don't need an appointment. To check hours or find an urgent care near you, visit [email or web address]. Online care    Get online care from [facility] for more than 70 common problems, like colds, allergies and infections. Open 24 hours every day at:   [email or web address]   Need help deciding?    For advice about where to be seen, you may call your clinic and ask to speak with a nurse. We're here for you 24 hours every day.         If you are deaf or hard of hearing, please let us know. We provide many free services including sign language interpreters, oral interpreters, TTYs, telephone  amplifiers, note takers and written materials.               Ron Dunn MD, MD  [facility]

## 2019-05-03 LAB
BACTERIA SPEC CULT: NORMAL
SPECIMEN SOURCE: NORMAL

## 2019-05-10 ENCOUNTER — OFFICE VISIT (OUTPATIENT)
Dept: FAMILY MEDICINE | Facility: CLINIC | Age: 29
End: 2019-05-10
Payer: COMMERCIAL

## 2019-05-10 VITALS
DIASTOLIC BLOOD PRESSURE: 77 MMHG | TEMPERATURE: 98.4 F | OXYGEN SATURATION: 95 % | SYSTOLIC BLOOD PRESSURE: 112 MMHG | HEART RATE: 100 BPM

## 2019-05-10 DIAGNOSIS — Z20.828 EXPOSURE TO INFLUENZA: ICD-10-CM

## 2019-05-10 DIAGNOSIS — J00 ACUTE NASOPHARYNGITIS: Primary | ICD-10-CM

## 2019-05-10 DIAGNOSIS — R07.0 THROAT PAIN: ICD-10-CM

## 2019-05-10 LAB
DEPRECATED S PYO AG THROAT QL EIA: NORMAL
FLUAV+FLUBV AG SPEC QL: NEGATIVE
FLUAV+FLUBV AG SPEC QL: NEGATIVE
SPECIMEN SOURCE: NORMAL
SPECIMEN SOURCE: NORMAL

## 2019-05-10 PROCEDURE — 87804 INFLUENZA ASSAY W/OPTIC: CPT | Performed by: FAMILY MEDICINE

## 2019-05-10 PROCEDURE — 87081 CULTURE SCREEN ONLY: CPT | Performed by: FAMILY MEDICINE

## 2019-05-10 PROCEDURE — 99213 OFFICE O/P EST LOW 20 MIN: CPT | Performed by: FAMILY MEDICINE

## 2019-05-10 PROCEDURE — 87880 STREP A ASSAY W/OPTIC: CPT | Performed by: FAMILY MEDICINE

## 2019-05-10 NOTE — PROGRESS NOTES
SUBJECTIVE:   Shanna Smith is a 29 year old female who presents to clinic today for the following   health issues:    Patient is getting checked for flu and strep.  Her  is sick with influenza B.  Her twins were ill with cold symptoms.  She has a cough, chills, runny nose and fatigue.  She has a 4 week old baby also.  She did get a flu shot         Social History     Tobacco Use     Smoking status: Never Smoker     Smokeless tobacco: Never Used   Substance Use Topics     Alcohol use: No        Additional history: as documented    Reviewed  and updated as needed this visit by clinical staff  Tobacco  Allergies  Meds  Med Hx  Surg Hx  Fam Hx  Soc Hx        Reviewed and updated as needed this visit by Provider         Patient Active Problem List   Diagnosis     CARDIOVASCULAR SCREENING; LDL GOAL LESS THAN 160     Pregnancy with history of miscarriage     Papanicolaou smear of cervix with low grade squamous intraepithelial lesion (LGSIL)     Cervical high risk HPV (human papillomavirus) test positive     Past Surgical History:   Procedure Laterality Date     NO HISTORY OF SURGERY         Social History     Tobacco Use     Smoking status: Never Smoker     Smokeless tobacco: Never Used   Substance Use Topics     Alcohol use: No     Family History   Problem Relation Age of Onset     Asthma Mother      Hypertension Mother      Asthma Brother      Diabetes No family hx of      Breast Cancer No family hx of      Cancer - colorectal No family hx of            ROS:  Constitutional, HEENT, cardiovascular, pulmonary, gi and gu systems are negative, except as otherwise noted.    OBJECTIVE:     /77 (BP Location: Right arm, Patient Position: Chair, Cuff Size: Adult Regular)   Pulse 100   Temp 98.4  F (36.9  C) (Oral)   LMP 06/22/2018 (Exact Date)   SpO2 95%   There is no height or weight on file to calculate BMI.  GENERAL: alert, no distress and fatigued  EYES: Eyes grossly normal to inspection, PERRL and  conjunctivae and sclerae normal  HENT: normal cephalic/atraumatic, ear canals and TM's normal, nose and mouth without ulcers or lesions, oropharynx clear, oral mucous membranes moist and tonsillar erythema  NECK: bilateral anterior cervical adenopathy, no asymmetry, masses, or scars and thyroid normal to palpation  RESP: lungs clear to auscultation - no rales, rhonchi or wheezes  CV: regular rate and rhythm, normal S1 S2, no S3 or S4, no murmur, click or rub, no peripheral edema and peripheral pulses strong  MS: no gross musculoskeletal defects noted, no edema  PSYCH: mentation appears normal and fatigued    Diagnostic Test Results:  Results for orders placed or performed in visit on 05/10/19 (from the past 24 hour(s))   Influenza A/B antigen   Result Value Ref Range    Influenza A/B Agn Specimen Nasal     Influenza A Negative NEG^Negative    Influenza B Negative NEG^Negative   Rapid strep screen   Result Value Ref Range    Specimen Description Throat     Rapid Strep A Screen       NEGATIVE: No Group A streptococcal antigen detected by immunoassay, await culture report.       ASSESSMENT/PLAN:       ICD-10-CM    1. Acute nasopharyngitis J00    2. Exposure to influenza Z20.828 Influenza A/B antigen   3. Throat pain R07.0 Rapid strep screen     Beta strep group A culture         Use nasal saline spray or neti pot 4-5 times a day.      Echinacea tea at the being not daily    Vit C drinks like Emergency C     Elderberry syrupe 2-3 times a day     Zinc cough drop     Humidifier in your bedroom at night     Deepika Hernandez DO  [facility]

## 2019-05-11 LAB
BACTERIA SPEC CULT: NORMAL
SPECIMEN SOURCE: NORMAL

## 2019-11-14 ENCOUNTER — OFFICE VISIT (OUTPATIENT)
Dept: URGENT CARE | Facility: URGENT CARE | Age: 29
End: 2019-11-14
Payer: COMMERCIAL

## 2019-11-14 VITALS
SYSTOLIC BLOOD PRESSURE: 144 MMHG | TEMPERATURE: 98.3 F | DIASTOLIC BLOOD PRESSURE: 87 MMHG | OXYGEN SATURATION: 98 % | BODY MASS INDEX: 29.65 KG/M2 | HEART RATE: 62 BPM | WEIGHT: 195 LBS

## 2019-11-14 DIAGNOSIS — R50.9 FEVER, UNSPECIFIED FEVER CAUSE: ICD-10-CM

## 2019-11-14 DIAGNOSIS — J06.9 VIRAL UPPER RESPIRATORY TRACT INFECTION WITH COUGH: ICD-10-CM

## 2019-11-14 DIAGNOSIS — J02.9 SORE THROAT: ICD-10-CM

## 2019-11-14 DIAGNOSIS — H66.001 ACUTE SUPPURATIVE OTITIS MEDIA OF RIGHT EAR WITHOUT SPONTANEOUS RUPTURE OF TYMPANIC MEMBRANE, RECURRENCE NOT SPECIFIED: Primary | ICD-10-CM

## 2019-11-14 LAB
DEPRECATED S PYO AG THROAT QL EIA: NORMAL
SPECIMEN SOURCE: NORMAL

## 2019-11-14 PROCEDURE — 87880 STREP A ASSAY W/OPTIC: CPT | Performed by: PHYSICIAN ASSISTANT

## 2019-11-14 PROCEDURE — 87081 CULTURE SCREEN ONLY: CPT | Performed by: PHYSICIAN ASSISTANT

## 2019-11-14 PROCEDURE — 99214 OFFICE O/P EST MOD 30 MIN: CPT | Performed by: PHYSICIAN ASSISTANT

## 2019-11-14 RX ORDER — ACETAMINOPHEN 325 MG/1
325-650 TABLET ORAL EVERY 6 HOURS PRN
Qty: 1 BOTTLE | Refills: 1 | Status: SHIPPED | OUTPATIENT
Start: 2019-11-14 | End: 2021-06-08

## 2019-11-14 RX ORDER — IBUPROFEN 200 MG
200 TABLET ORAL EVERY 4 HOURS PRN
Qty: 120 TABLET | Refills: 1 | Status: SHIPPED | OUTPATIENT
Start: 2019-11-14 | End: 2021-02-26

## 2019-11-14 RX ORDER — AMOXICILLIN 875 MG
875 TABLET ORAL 2 TIMES DAILY
Qty: 20 TABLET | Refills: 0 | Status: SHIPPED | OUTPATIENT
Start: 2019-11-14 | End: 2020-02-12

## 2019-11-14 ASSESSMENT — ENCOUNTER SYMPTOMS
NAUSEA: 0
LIGHT-HEADEDNESS: 0
BACK PAIN: 0
EYES NEGATIVE: 1
WEAKNESS: 0
ALLERGIC/IMMUNOLOGIC NEGATIVE: 1
DIZZINESS: 0
NECK STIFFNESS: 0
RHINORRHEA: 0
FEVER: 0
WOUND: 0
MYALGIAS: 0
CHILLS: 0
CARDIOVASCULAR NEGATIVE: 1
SORE THROAT: 1
SHORTNESS OF BREATH: 0
NECK PAIN: 0
COUGH: 1
ENDOCRINE NEGATIVE: 1
BRUISES/BLEEDS EASILY: 0
HEMATOLOGIC/LYMPHATIC NEGATIVE: 1
MUSCULOSKELETAL NEGATIVE: 1
VOMITING: 0
HEADACHES: 0
ARTHRALGIAS: 0
PALPITATIONS: 0
JOINT SWELLING: 0
DIARRHEA: 0

## 2019-11-14 NOTE — PROGRESS NOTES
Chief Complaint:     Chief Complaint   Patient presents with     Ear Problem     Patient complains of pain in right ear and throat pain        HPI: Shanna Smith is an 29 year old female who presents with chest congestion, cough nonproductive, occasional, ear pain right, nasal congestion and sore throat. Symptoms began 2  days ago and has gradually worsening.  There is no shortness of breath, wheezing and chest pain.      Recent travel?  no.      ROS:     Review of Systems   Constitutional: Negative for chills and fever.   HENT: Positive for congestion, ear pain and sore throat. Negative for rhinorrhea.    Eyes: Negative.    Respiratory: Positive for cough. Negative for shortness of breath.    Cardiovascular: Negative.  Negative for chest pain and palpitations.   Gastrointestinal: Negative for diarrhea, nausea and vomiting.   Endocrine: Negative.    Genitourinary: Negative.    Musculoskeletal: Negative.  Negative for arthralgias, back pain, joint swelling, myalgias, neck pain and neck stiffness.   Skin: Negative.  Negative for rash and wound.   Allergic/Immunologic: Negative.  Negative for immunocompromised state.   Neurological: Negative for dizziness, weakness, light-headedness and headaches.   Hematological: Negative.  Does not bruise/bleed easily.        Respiratory History  occasional episodes of bronchitis       Family History   Family History   Problem Relation Age of Onset     Asthma Mother      Hypertension Mother      Asthma Brother      Diabetes No family hx of      Breast Cancer No family hx of      Cancer - colorectal No family hx of         Problem history  Patient Active Problem List   Diagnosis     CARDIOVASCULAR SCREENING; LDL GOAL LESS THAN 160     Pregnancy with history of miscarriage     Papanicolaou smear of cervix with low grade squamous intraepithelial lesion (LGSIL)     Cervical high risk HPV (human papillomavirus) test positive        Allergies  No Known Allergies     Social History  Social  History     Socioeconomic History     Marital status:      Spouse name: Souleymane     Number of children: 3     Years of education: Not on file     Highest education level: Not on file   Occupational History     Employer: [employer]   Social Needs     Financial resource strain: Not on file     Food insecurity:     Worry: Not on file     Inability: Not on file     Transportation needs:     Medical: Not on file     Non-medical: Not on file   Tobacco Use     Smoking status: Never Smoker     Smokeless tobacco: Never Used   Substance and Sexual Activity     Alcohol use: No     Drug use: No     Sexual activity: Yes     Partners: Male   Lifestyle     Physical activity:     Days per week: Not on file     Minutes per session: Not on file     Stress: Not on file   Relationships     Social connections:     Talks on phone: Not on file     Gets together: Not on file     Attends Islam service: Not on file     Active member of club or organization: Not on file     Attends meetings of clubs or organizations: Not on file     Relationship status: Not on file     Intimate partner violence:     Fear of current or ex partner: Not on file     Emotionally abused: Not on file     Physically abused: Not on file     Forced sexual activity: Not on file   Other Topics Concern     Parent/sibling w/ CABG, MI or angioplasty before 65F 55M? No   Social History Narrative     Not on file        Current Meds    Current Outpatient Medications:      amoxicillin (AMOXIL) 875 MG tablet, Take 1 tablet (875 mg) by mouth 2 times daily for 10 days, Disp: 20 tablet, Rfl: 0     acetaminophen (TYLENOL) 325 MG tablet, Take 325-650 mg by mouth every 6 hours as needed. As needed, Disp: , Rfl:      cholecalciferol (VITAMIN D3) 5000 units TABS tablet, Take 1 tablet (5,000 Units) by mouth daily (Patient not taking: Reported on 11/14/2019), Disp: 90 tablet, Rfl: 1     ferrous sulfate (FEROSUL) 325 (65 Fe) MG tablet, Take 1 tablet (325 mg) by mouth daily (with  breakfast) (Patient not taking: Reported on 5/1/2019), Disp: 100 tablet, Rfl: 1     fluticasone (FLONASE) 50 MCG/ACT nasal spray, Spray 1-2 sprays into both nostrils daily (Patient not taking: Reported on 5/1/2019), Disp: 16 g, Rfl: 4     loratadine (CLARITIN) 10 MG tablet, Take 1 tablet (10 mg) by mouth daily (Patient not taking: Reported on 11/14/2019), Disp: 90 tablet, Rfl: 3     loratadine (CLARITIN) 10 MG tablet, Take 1 tablet (10 mg) by mouth daily (Patient not taking: Reported on 5/1/2019), Disp: 90 tablet, Rfl: 3     order for DME, Equipment being ordered: pregnancy belt (Patient not taking: Reported on 5/1/2019), Disp: 1 Device, Rfl: 0     Prenatal Vit-Fe Fumarate-FA (PRENATAL MULTIVITAMIN PLUS IRON) 27-0.8 MG TABS per tablet, Take 1 tablet by mouth daily (Patient not taking: Reported on 5/1/2019), Disp: 100 tablet, Rfl: 3        OBJECTIVE     Vital signs reviewed by Arian Dewey PA-C  BP (!) 144/87 (BP Location: Left arm, Patient Position: Chair, Cuff Size: Adult Regular)   Pulse 62   Temp 98.3  F (36.8  C) (Oral)   Wt 88.5 kg (195 lb)   SpO2 98%   BMI 29.65 kg/m       Physical Exam  Vitals signs and nursing note reviewed.   Constitutional:       General: She is not in acute distress.     Appearance: She is well-developed. She is not ill-appearing, toxic-appearing or diaphoretic.   HENT:      Head: Normocephalic and atraumatic.      Right Ear: Hearing, ear canal and external ear normal. Tympanic membrane is erythematous and bulging. Tympanic membrane is not perforated or retracted.      Left Ear: Hearing, tympanic membrane, ear canal and external ear normal. Tympanic membrane is not perforated, erythematous, retracted or bulging.      Nose: Mucosal edema and congestion present. No rhinorrhea.      Right Sinus: No maxillary sinus tenderness or frontal sinus tenderness.      Left Sinus: No maxillary sinus tenderness or frontal sinus tenderness.      Mouth/Throat:      Pharynx: Posterior  oropharyngeal erythema present. No pharyngeal swelling, oropharyngeal exudate or uvula swelling.      Tonsils: No tonsillar exudate or tonsillar abscesses. Swellin on the right. 0 on the left.   Eyes:      General:         Right eye: No discharge.         Left eye: No discharge.      Pupils: Pupils are equal, round, and reactive to light.   Neck:      Musculoskeletal: Normal range of motion and neck supple.   Cardiovascular:      Rate and Rhythm: Normal rate and regular rhythm.      Heart sounds: Normal heart sounds. No murmur. No friction rub. No gallop.    Pulmonary:      Effort: Pulmonary effort is normal. No respiratory distress.      Breath sounds: Normal breath sounds. No decreased breath sounds, wheezing, rhonchi or rales.   Chest:      Chest wall: No tenderness.   Abdominal:      General: Bowel sounds are normal. There is no distension.      Palpations: Abdomen is soft. There is no mass.      Tenderness: There is no abdominal tenderness. There is no guarding.   Lymphadenopathy:      Head:      Right side of head: No submental, submandibular, tonsillar, preauricular or posterior auricular adenopathy.      Left side of head: No submental, submandibular, tonsillar, preauricular or posterior auricular adenopathy.      Cervical: No cervical adenopathy.      Right cervical: No superficial or posterior cervical adenopathy.     Left cervical: No superficial or posterior cervical adenopathy.   Skin:     General: Skin is warm and dry.   Neurological:      Mental Status: She is alert and oriented to person, place, and time.      Cranial Nerves: No cranial nerve deficit.      Deep Tendon Reflexes: Reflexes are normal and symmetric.   Psychiatric:         Behavior: Behavior normal. Behavior is cooperative.         Thought Content: Thought content normal.         Judgment: Judgment normal.           Labs:     Results for orders placed or performed in visit on 19   Strep, Rapid Screen     Status: None   Result  Value Ref Range    Specimen Description Throat     Rapid Strep A Screen       NEGATIVE: No Group A streptococcal antigen detected by immunoassay, await culture report.       Medical Decision Making:    Differential Diagnosis:  URI Adult/Peds:  Acute right otitis media, Bronchitis-viral, Influenza, Pneumonia, Strep pharyngitis, Tonsilitis, Viral pharyngitis, Viral syndrome and Viral upper respiratory illness        ASSESSMENT    1. Acute suppurative otitis media of right ear without spontaneous rupture of tympanic membrane, recurrence not specified    2. Viral upper respiratory tract infection with cough    3. Sore throat        PLAN    Patient presents with 2 day(s) chest congestion, cough nonproductive, occasional, ear pain right, nasal congestion and sore throat.    Patient is in no acute distress.    Temp is 98.3 in clinic today, lung sounds were clear, and O2 sats at 98% on RA.  Imaging to rule out pneumonia is not indicated at this time.  RST was negative.  We will call with culture results only if positive.  Rx for Amoxicillin for ear infection.  Rest, Push fluids, vaporizer, elevation of head of bed.  Ibuprofen and or Tylenol for any fever or body aches.  Over the counter cough suppressant- PRN- as discussed.   If symptoms worsen, recheck immediately otherwise follow up with your PCP in 1 week if symptoms are not improving.  Worrisome symptoms discussed with instructions to go to the ED.  Patient verbalized understanding and agreed with this plan.         Arian Dewey PA-C  11/14/2019, 11:57 AM

## 2019-11-15 LAB
BACTERIA SPEC CULT: NORMAL
SPECIMEN SOURCE: NORMAL

## 2020-02-12 ENCOUNTER — OFFICE VISIT (OUTPATIENT)
Dept: FAMILY MEDICINE | Facility: CLINIC | Age: 30
End: 2020-02-12
Payer: COMMERCIAL

## 2020-02-12 VITALS
TEMPERATURE: 98.6 F | HEART RATE: 92 BPM | SYSTOLIC BLOOD PRESSURE: 116 MMHG | OXYGEN SATURATION: 95 % | HEIGHT: 68 IN | BODY MASS INDEX: 29.01 KG/M2 | DIASTOLIC BLOOD PRESSURE: 70 MMHG | WEIGHT: 191.4 LBS

## 2020-02-12 DIAGNOSIS — J20.9 ACUTE BRONCHITIS, UNSPECIFIED ORGANISM: Primary | ICD-10-CM

## 2020-02-12 PROCEDURE — 99214 OFFICE O/P EST MOD 30 MIN: CPT | Performed by: FAMILY MEDICINE

## 2020-02-12 RX ORDER — IBUPROFEN 400 MG/1
400 TABLET, FILM COATED ORAL EVERY 6 HOURS PRN
Qty: 90 TABLET | Refills: 1 | Status: SHIPPED | OUTPATIENT
Start: 2020-02-12 | End: 2021-02-26

## 2020-02-12 RX ORDER — AZITHROMYCIN 250 MG/1
TABLET, FILM COATED ORAL
Qty: 6 TABLET | Refills: 0 | Status: SHIPPED | OUTPATIENT
Start: 2020-02-12 | End: 2020-02-17

## 2020-02-12 RX ORDER — BENZONATATE 200 MG/1
200 CAPSULE ORAL 3 TIMES DAILY PRN
Qty: 30 CAPSULE | Refills: 0 | Status: SHIPPED | OUTPATIENT
Start: 2020-02-12 | End: 2021-01-12

## 2020-02-12 RX ORDER — ACETAMINOPHEN 500 MG
500-1000 TABLET ORAL EVERY 6 HOURS PRN
Qty: 90 TABLET | Refills: 1 | Status: ON HOLD | OUTPATIENT
Start: 2020-02-12 | End: 2021-11-05

## 2020-02-12 RX ORDER — PREDNISONE 20 MG/1
40 TABLET ORAL DAILY
Qty: 10 TABLET | Refills: 0 | Status: SHIPPED | OUTPATIENT
Start: 2020-02-12 | End: 2020-02-17

## 2020-02-12 ASSESSMENT — MIFFLIN-ST. JEOR: SCORE: 1636.68

## 2020-02-12 NOTE — PROGRESS NOTES
"Helene Smith is a 30 year old female who presents to clinic today for the following health issues:    HPI     Acute Illness   Acute illness concerns:  Cough  Onset: 1-2 weeks    Fever: no    Chills/Sweats: no    Headache (location?): YES    Sinus Pressure:no    Conjunctivitis:  no    Ear Pain: no    Rhinorrhea: YES    Congestion: YES    Sore Throat: no      Cough: YES-productive of yellow sputum    Wheeze: YES    Decreased Appetite: no     Nausea: no     Vomiting: no     Diarrhea:  no     Dysuria/Freq.: no     Fatigue/Achiness: no     Sick/Strep Exposure: no      Therapies Tried and outcome: Tylenol, Honey-Lemon cough syrup      Patient Active Problem List   Diagnosis     CARDIOVASCULAR SCREENING; LDL GOAL LESS THAN 160     Pregnancy with history of miscarriage     Papanicolaou smear of cervix with low grade squamous intraepithelial lesion (LGSIL)     Cervical high risk HPV (human papillomavirus) test positive     Past Surgical History:   Procedure Laterality Date     NO HISTORY OF SURGERY         Social History     Tobacco Use     Smoking status: Never Smoker     Smokeless tobacco: Never Used   Substance Use Topics     Alcohol use: No     Family History   Problem Relation Age of Onset     Asthma Mother      Hypertension Mother      Asthma Brother      Diabetes No family hx of      Breast Cancer No family hx of      Cancer - colorectal No family hx of            Review of Systems   ROS COMP: Constitutional, HEENT, cardiovascular, pulmonary, gi and gu systems are negative, except as otherwise noted.      Objective    /70 (BP Location: Right arm, Patient Position: Sitting, Cuff Size: Adult Large)   Pulse 92   Temp 98.6  F (37  C) (Oral)   Ht 1.727 m (5' 8\")   Wt 86.8 kg (191 lb 6.4 oz)   LMP 01/25/2020   SpO2 95%   Breastfeeding No   BMI 29.10 kg/m    Body mass index is 29.1 kg/m .  Physical Exam   GENERAL: healthy, alert and no distress  EYES: Eyes grossly normal to inspection  HENT: ear " canals and TM's normal, nose and mouth without ulcers or lesions  NECK: bilateral anterior cervical adenopathy  RESP: expiratory wheezes throughout  CV: regular rates and rhythm, normal S1 S2, no S3 or S4 and no murmur, click or rub          Assessment & Plan     1. Acute bronchitis, unspecified organism  - azithromycin (ZITHROMAX) 250 MG tablet; Take 2 tablets (500 mg) by mouth daily for 1 day, THEN 1 tablet (250 mg) daily for 4 days.  Dispense: 6 tablet; Refill: 0  - predniSONE (DELTASONE) 20 MG tablet; Take 2 tablets (40 mg) by mouth daily for 5 days  Dispense: 10 tablet; Refill: 0  - benzonatate (TESSALON) 200 MG capsule; Take 1 capsule (200 mg) by mouth 3 times daily as needed for cough  Dispense: 30 capsule; Refill: 0  - acetaminophen (TYLENOL) 500 MG tablet; Take 1-2 tablets (500-1,000 mg) by mouth every 6 hours as needed for mild pain or fever  Dispense: 90 tablet; Refill: 1  - ibuprofen (ADVIL/MOTRIN) 400 MG tablet; Take 1 tablet (400 mg) by mouth every 6 hours as needed for moderate pain or fever  Dispense: 90 tablet; Refill: 1       Return in about 1 week (around 2/19/2020) for bronchitis if symptoms persist.    Mary Cordova DO  [facility]

## 2020-02-12 NOTE — PATIENT INSTRUCTIONS

## 2020-02-23 ENCOUNTER — HEALTH MAINTENANCE LETTER (OUTPATIENT)
Age: 30
End: 2020-02-23

## 2020-12-08 ENCOUNTER — ALLIED HEALTH/NURSE VISIT (OUTPATIENT)
Dept: NURSING | Facility: CLINIC | Age: 30
End: 2020-12-08
Payer: COMMERCIAL

## 2020-12-08 DIAGNOSIS — Z23 NEED FOR PROPHYLACTIC VACCINATION AND INOCULATION AGAINST INFLUENZA: Primary | ICD-10-CM

## 2020-12-08 PROCEDURE — 90686 IIV4 VACC NO PRSV 0.5 ML IM: CPT

## 2020-12-08 PROCEDURE — 90471 IMMUNIZATION ADMIN: CPT

## 2020-12-08 PROCEDURE — 99207 PR NO CHARGE NURSE ONLY: CPT

## 2020-12-08 NOTE — PROGRESS NOTES
Patient instructed to remain in clinic for 15 minutes afterwards, and to report any adverse reaction to me immediately.    Prior to injection verified patient identity using patient's name and date of birth.  Vaccine information supplied for influenza.  Yuly See EBONY Flores

## 2021-01-12 ENCOUNTER — OFFICE VISIT (OUTPATIENT)
Dept: FAMILY MEDICINE | Facility: CLINIC | Age: 31
End: 2021-01-12
Payer: COMMERCIAL

## 2021-01-12 VITALS
RESPIRATION RATE: 20 BRPM | DIASTOLIC BLOOD PRESSURE: 60 MMHG | WEIGHT: 198 LBS | BODY MASS INDEX: 30.11 KG/M2 | OXYGEN SATURATION: 97 % | HEART RATE: 108 BPM | SYSTOLIC BLOOD PRESSURE: 120 MMHG

## 2021-01-12 DIAGNOSIS — J30.81 ALLERGIC RHINITIS DUE TO ANIMALS: ICD-10-CM

## 2021-01-12 DIAGNOSIS — Z91.09 ENVIRONMENTAL ALLERGIES: Primary | ICD-10-CM

## 2021-01-12 DIAGNOSIS — J30.1 ALLERGIC RHINITIS DUE TO POLLEN, UNSPECIFIED SEASONALITY: ICD-10-CM

## 2021-01-12 PROCEDURE — 99213 OFFICE O/P EST LOW 20 MIN: CPT | Performed by: NURSE PRACTITIONER

## 2021-01-12 RX ORDER — LORATADINE 10 MG/1
10 TABLET ORAL DAILY
Qty: 90 TABLET | Refills: 3 | Status: SHIPPED | OUTPATIENT
Start: 2021-01-12

## 2021-01-12 RX ORDER — FLUTICASONE PROPIONATE 50 MCG
1 SPRAY, SUSPENSION (ML) NASAL DAILY
Qty: 16 G | Refills: 11 | Status: SHIPPED | OUTPATIENT
Start: 2021-01-12 | End: 2021-06-08

## 2021-01-12 NOTE — PATIENT INSTRUCTIONS
Patient Education     Controlling Allergens: Pets   Constant exposure to allergens means constant allergy symptoms. That s why it's important to control or stay away from the allergens that cause your symptoms. If you are allergic to pets, the tips below may help reduce your exposure.    Pet allergies  Many people think that pet allergy is caused by the fur of cats and dogs. But researchers have found that the major allergens are proteins made by glands in the animals  skin and mouth, as well as their urine. These proteins are shed in flakes of skin called dander. Allergy-causing proteins in saliva stick to the fur when the animal cleans itself. Urine also contains allergy-causing proteins.  Cats are more likely than dogs to cause allergic reactions. This may be because they lick themselves more. They may also be held more and spend more time indoors. Cats are also often allowed anywhere in a home, including the bedroom. But any furry animal (such as guinea pigs, mice, rats, and birds) can also cause allergies. There is no such thing as a hypoallergenic or allergen-free cat or dog. Hair or fur does not affect the amount of allergen that an animal produces.  If you have allergy symptoms and live in a house with a cat, dog, or other pet, talk with your healthcare provider or with an allergist. They will do an allergy evaluation. Together you can create a treatment plan to help manage your allergy symptoms. You may not need to give up your pet to prevent symptoms.  Controlling animal allergens  The best way to stay away from animal allergens is to not have a pet. And to ensure your house or apartment is free of pests. If you already have a pet and want to keep it, try to reduce your exposure as much as possible. These tips may help:    Whenever possible, keep pets outdoors with the right type of weather protection. This won't keep pet dander from getting into your home on clothing or shoes. But it can reduce the  amount of dander in the house.    Never let pets into your bedroom or on your bed. It can be hard to control pet allergens from getting into the bedroom. You can get allergens on your clothes simply from sitting on your couch. And you'll end up bringing those allergens with you into your bedroom when you get ready for bed.    Try to keep pets off sofas, chairs, rugs, and carpeting. Also think about removing carpets. Dust hardwood floors on a regular basis.    Use an air-cleaning unit with a HEPA filter, especially in the bedroom.    Use filter bags or vacuums designed to reduce allergens.    Wash your hands after you touch a pet. Try to keep pets away from your face.    Brush and bathe your pet often. Bathing pets helps reduce dander. Bathing also washes other allergens such as dust, mold, and pollen off the animal s fur. Brush your pets outside, not in the house.  [employer] last reviewed this educational content on 5/1/2019 2000-2020 [employer]. [address] [place] [address]. All rights reserved. This information is not intended as a substitute for professional medical care. Always follow your healthcare professional's instructions.          Recommend treatment with Flonase nasal spray (to decrease mucosal inflammation in the sinuses and nose that allows improved sinus drainage)    You may want to try a nasal lavage (also known as nasal irrigation).  You can find over-the-counter products, such as Neti-Pot, at retail locations or make your own at home.  Instructions for homemade nasal lavage and more information on the process are available online at http://www.aafp.org/[email or web address]/1115/p1121.html.    Check your Mucinex spray to see of Oxymetazoline is ingredient.  If so- limit use to 3 days maximum.    Continue the antihistamine twice daily    [facility]     Discharged by : Katherin Fay NP  Paper scripts provided to patient : none     If you have any questions  regarding your visit please contact your care team:     Team Camila              Clinic Hours Telephone Number     Dr. Ron Fay, CNP   7am-7pm  Monday - Thursday   7am-5pm  Fridays  [phone]   (Appointment scheduling available 24/7)     RN Line  [phone] option 2     Urgent Care - [place] and [place] - 11am-9pm Monday-Friday Saturday-Sunday- 9am-5pm     [place] -   5pm-9pm Monday-Friday Saturday-Sunday- 9am-5pm    [phone] - [place] [place]    [phone] - [place]     For a Price Quote for your services, please call our Consumer Price Line at [phone].     What options do I have for visits at the clinic other than the traditional office visit?     To expand how we care for you, many of our providers are utilizing electronic visits (e-visits) and telephone visits, when medically appropriate, for interactions with their patients rather than a visit in the clinic. We also offer nurse visits for many medical concerns. Just like any other service, we will bill your insurance company for this type of visit based on time spent on the phone with your provider. Not all insurance companies cover these visits. Please check with your medical insurance if this type of visit is covered. You will be responsible for any charges that are not paid by your insurance.     E-visits via [employer]: generally incur a $45.00 fee.     Telephone visits:  Time spent on the phone: *charged based on time that is spent on the phone in increments of 10 minutes. Estimated cost:   5-10 mins $30.00   11-20 mins. $59.00   21-30 mins. $85.00       Use [employer] (secure email communication and access to your chart) to send your primary care provider a message or make an appointment. Ask someone on your Team how to sign up for [employer].     As always, Thank you for trusting us with your health care needs!      [facility]:    Scheduling  Appointments  Azalea Ruiz, [facility]  Call: [phone]    [facility], [facility]  Call: [phone]    [facility]  Call: [phone]    For Gastroenterology referrals   [facility]   Clinics and Surgery Center, 4th Floor   [address] [place] [address]   Appointments: [phone]    WHERE TO GO FOR CARE?    Clinic    Make an appointment if you:       Are sick (cold, cough, flu, sore throat, earache or in pain).       Have a small injury (sprain, small cut, burn or broken bone).       Need a physical exam, Pap smear, vaccine or prescription refill.       Have questions about your health or medicines.    To reach us:      Call [phone] ([phone]). Open 24 hours every day. (For counseling services, call [phone].)    Log into [employer] at [employer]. (Visit [email or web address] to create an account.) Hospital emergency room    An emergency is a serious or life- threatening problem that must be treated right away.    Call 753 or get to the hospital if you have:      Very bad or sudden:            - Chest pain or pressure         - Bleeding         - Head or belly pain         - Dizziness or trouble seeing, walking or                          Speaking      Problems breathing      Blood in your vomit or you are coughing up blood      A major injury (knocked out, loss of a finger or limb, rape, broken bone protruding from skin)    A mental health crisis. (Or call the Mental Health Crisis line at 1-194.734.2252 or Suicide Prevention Hotline at 1-603.770.9312.)    Open 24 hours every day. You don't need an appointment.     Urgent care    Visit urgent care for sickness or small injuries when the clinic is closed. You don't need an appointment. To check hours or find an urgent care near you, visit [email or web address]. Online care    Get online care from [facility] for more than 70 common problems, like colds, allergies and infections.  Open 24 hours every day at:   [email or web address]   Need help deciding?    For advice about where to be seen, you may call your clinic and ask to speak with a nurse. We're here for you 24 hours every day.         If you are deaf or hard of hearing, please let us know. We provide many free services including sign language interpreters, oral interpreters, TTYs, telephone amplifiers, note takers and written materials.

## 2021-01-12 NOTE — PROGRESS NOTES
"  Assessment & Plan     Environmental allergies    - ALLERGY/ASTHMA ADULT REFERRAL  - fluticasone (FLONASE) 50 MCG/ACT nasal spray; Spray 1 spray into both nostrils daily    Allergic rhinitis due to animals  Recent exposures to guinea pigs and hay.  Recommend follow up with allergist for allergy testing.  Patient states removing the guinea pigs from her home is not an option at this time.  Consider hepa air filter.  - ALLERGY/ASTHMA ADULT REFERRAL  - fluticasone (FLONASE) 50 MCG/ACT nasal spray; Spray 1 spray into both nostrils daily    Allergic rhinitis due to pollen, unspecified seasonality    - fluticasone (FLONASE) 50 MCG/ACT nasal spray; Spray 1 spray into both nostrils daily  - loratadine (CLARITIN) 10 MG tablet; Take 1 tablet (10 mg) by mouth daily    Advised adding saline nasal spray twice daily and flonase daily  Continue antihistamine  Check your Mucinex spray to see of Oxymetazoline is ingredient.  If so- limit use to 3 days maximum.  Follow up with allergist                       BMI:   Estimated body mass index is 30.11 kg/m  as calculated from the following:    Height as of 2/12/20: 1.727 m (5' 8\").    Weight as of this encounter: 89.8 kg (198 lb).       Return in about 3 months (around 4/12/2021) for Physical Exam.    Katherin Fay NP  [facility]    Helene Otero is a 31 year old who presents to clinic today for the following health issues     HPI       Concern - cough and itching   Onset: 6 weeks ago when guinea pigs started living in her home  Description: sneezing, coughing, watery eyes, itchy ears, and throat irritaion   Intensity: moderate, severe  Progression of Symptoms:  worsening and intermittent  Accompanying Signs & Symptoms: and rash on face with swollen eye and HA   Previous history of similar problem: no  Precipitating factors:        Worsened by: being around Guinea pigs   Alleviating factors:        Improved by: taking OTC meds help some "   Therapies tried and outcome: Mucinex, Zyrtec, lorataidine, Bendadryl     Her symptoms all began when they had guinea pigs start living in their home.  Itchy throat.  Regularly taking Loratidine 10 mg in the morning Cetirizine 10 mg in the afternoon.  Benadryl as needed.  H/o spring seasonal allergies.  Throat swelling up, hard to breath when she is in the same room as the guinea pigs for 45 minutes.  The guinea pigs are located in a bedroom in the basement.  Uses a fleece blanket for the bedding.  Constant supply of hay and she wonders if she could be allergic to the hay.    She seasonally will get spring allergies which she will use Loratadine daily and this manages her symptoms.    Has 1 dog and no allergy symptoms with exposure to dog.  She is also using Mucinex nasal spray    Review of Systems   Constitutional, HEENT, cardiovascular, pulmonary, gi and gu systems are negative, except as otherwise noted.      Objective    /60 (BP Location: Right arm)   Pulse 108   Resp 20   Wt 89.8 kg (198 lb)   SpO2 97%   BMI 30.11 kg/m    Body mass index is 30.11 kg/m .  Physical Exam   GENERAL: healthy, alert and no distress  EYES: conjunctiva/corneas- conjunctival injection each eye mild  HENT: normal cephalic/atraumatic, ear canals and TM's normal, nasal mucosa edematous , rhinorrhea clear, oropharynx clear and oral mucous membranes moist  RESP: lungs clear to auscultation - no rales, rhonchi or wheezes  CV: regular rates and rhythm, normal S1 S2, no S3 or S4 and no murmur, click or rub  PSYCH: mentation appears normal, affect normal/bright

## 2021-02-26 ENCOUNTER — OFFICE VISIT (OUTPATIENT)
Dept: OBGYN | Facility: CLINIC | Age: 31
End: 2021-02-26
Payer: COMMERCIAL

## 2021-02-26 VITALS
SYSTOLIC BLOOD PRESSURE: 143 MMHG | OXYGEN SATURATION: 97 % | HEART RATE: 70 BPM | BODY MASS INDEX: 30.68 KG/M2 | DIASTOLIC BLOOD PRESSURE: 86 MMHG | WEIGHT: 201.8 LBS

## 2021-02-26 DIAGNOSIS — Z32.01 PREGNANCY TEST POSITIVE: ICD-10-CM

## 2021-02-26 DIAGNOSIS — N91.2 AMENORRHEA: Primary | ICD-10-CM

## 2021-02-26 LAB — HCG UR QL: POSITIVE

## 2021-02-26 PROCEDURE — 99214 OFFICE O/P EST MOD 30 MIN: CPT | Performed by: OBSTETRICS & GYNECOLOGY

## 2021-02-26 PROCEDURE — 81025 URINE PREGNANCY TEST: CPT | Performed by: OBSTETRICS & GYNECOLOGY

## 2021-02-26 RX ORDER — PNV NO.95/FERROUS FUM/FOLIC AC 28MG-0.8MG
1 TABLET ORAL DAILY
Qty: 90 TABLET | Refills: 3 | Status: SHIPPED | OUTPATIENT
Start: 2021-02-26 | End: 2021-10-29

## 2021-02-26 NOTE — PROGRESS NOTES
Shanna is a 31 year old,  who presents with absent menses.  LMP .  Suspected date of conception is 2021.    was going to get a vasectomy last year, but COVID derailed that.  He made another appointment for consult and vasectomy is next month, but she found out she was pregnant.   No breast tenderness, she has had very mild nausea, only a couple of different days.    She also wants to discuss the COVID vaccine.        OB History    Para Term  AB Living   5 3 3 0 1 3   SAB TAB Ectopic Multiple Live Births   1 0 0 0 3      # Outcome Date GA Lbr Kelechi/2nd Weight Sex Delivery Anes PTL Lv   5 Current            4 Term 19 40w4d  4.649 kg (10 lb 4 oz) M  None  KALINA      Birth Comments: OP      Name: Dakota   3 Term 10/15/15 38w0d  4.621 kg (10 lb 3 oz) F    KALINA      Name: Tanisha Luong   2 Term 13 40w0d  3.827 kg (8 lb 7 oz) F    KALINA      Name: Sherrie   1 SAB 12 12w0d             Birth Comments: 46 XX, no D&C       Gynecological History         LMP:  2020      STD: Chlamydia, HPV/ No PID/ She had the Mirena IUD in past.   She has had bnormal pap smears in the past        Past Medical History:   Diagnosis Date     Closed fracture of metatarsal of right foot     V MT     H/O colposcopy with cervical biopsy 3/2015    Negative     History of chlamydia 2011, 2011    repeat infections     LSIL (low grade squamous intraepithelial lesion) on Pap smear 2/4/15    + HR HPV 18       Past Surgical History:   Procedure Laterality Date     NO HISTORY OF SURGERY         No Known Allergies    Current Outpatient Medications   Medication Sig Dispense Refill     acetaminophen (TYLENOL) 325 MG tablet Take 1-2 tablets (325-650 mg) by mouth every 6 hours as needed for mild pain 1 Bottle 1     loratadine (CLARITIN) 10 MG tablet Take 1 tablet (10 mg) by mouth daily 90 tablet 3     acetaminophen (TYLENOL) 500 MG tablet Take 1-2 tablets (500-1,000 mg) by  mouth every 6 hours as needed for mild pain or fever 90 tablet 1     fluticasone (FLONASE) 50 MCG/ACT nasal spray Spray 1 spray into both nostrils daily 16 g 11     ibuprofen (ADVIL/MOTRIN) 200 MG tablet Take 1 tablet (200 mg) by mouth every 4 hours as needed for mild pain 120 tablet 1     ibuprofen (ADVIL/MOTRIN) 400 MG tablet Take 1 tablet (400 mg) by mouth every 6 hours as needed for moderate pain or fever 90 tablet 1       Social History     Tobacco Use     Smoking status: Never Smoker     Smokeless tobacco: Never Used   Substance Use Topics     Alcohol use: No     Drug use: No       Family History   Problem Relation Age of Onset     Asthma Mother      Hypertension Mother      Asthma Brother      Diabetes No family hx of      Breast Cancer No family hx of      Cancer - colorectal No family hx of          ROS:    4 point ROS including Respiratory, CV, GI and , other than that noted in the HPI and the PMH,  is negative     EXAM:  BP (!) 143/86 (BP Location: Right arm, Cuff Size: Adult Regular)   Pulse 70   Wt 91.5 kg (201 lb 12.8 oz)   LMP 12/18/2020 (Approximate)   SpO2 97%   Breastfeeding No   BMI 30.68 kg/m    General:  WNWD female, NAD  Alert  Oriented x 3  Gait:  Normal  Skin:  Normal skin turgor  HEENT:  NC/AT, EOMI  Abdomen:  Non-tender, non-distended.  Pelvic exam:  Not performed  Extremities:  No clubbing, no cyanosis and no edema.      A: Missed Menses  Pregnancy test positive   Weeks gestation: 10 weeks  COVID - 19 vaccine discussion.     P: 1) Prenatal vitamin prescription sent to pharmacy.   2) Diet, exercise, work, and environmental hazards reviewed. Toxoplasmosis precautions. Avoid the use of tobacco, ETOH, and street drugs. Signs and symptoms to monitor for and report discussed. Will schedule first OB visit at about 12 weeks gestation.   3) Shanna had questions about the COVID vaccine.  We discussed the vaccine and the virus in detail with the associated risks and benefits and goals and  limitations of testing and vaccinations.  Over 20 minutes has been spent today to review this.    100% of face to face time was spent with the patient today entirely in education and counseling regarding first trimester anticipatory guidance, prenatal visit schedule, delivery hospital, and the vaccine.     Total time preparing to see patient with reviewing prior encounter and labs, face to face time,  and coordinating care on the same calendar date: 35 minutes.     Alpesh Rodríguez MD

## 2021-03-19 ENCOUNTER — PRENATAL OFFICE VISIT (OUTPATIENT)
Dept: OBGYN | Facility: CLINIC | Age: 31
End: 2021-03-19
Payer: COMMERCIAL

## 2021-03-19 VITALS
HEART RATE: 69 BPM | WEIGHT: 203 LBS | DIASTOLIC BLOOD PRESSURE: 85 MMHG | OXYGEN SATURATION: 98 % | SYSTOLIC BLOOD PRESSURE: 129 MMHG | BODY MASS INDEX: 30.87 KG/M2

## 2021-03-19 DIAGNOSIS — Z34.81 MULTIGRAVIDA IN FIRST TRIMESTER: Primary | ICD-10-CM

## 2021-03-19 LAB
ABO + RH BLD: NORMAL
ABO + RH BLD: NORMAL
BASOPHILS # BLD AUTO: 0 10E9/L (ref 0–0.2)
BASOPHILS NFR BLD AUTO: 0.2 %
BLD GP AB SCN SERPL QL: NORMAL
BLOOD BANK CMNT PATIENT-IMP: NORMAL
DIFFERENTIAL METHOD BLD: NORMAL
EOSINOPHIL # BLD AUTO: 0.4 10E9/L (ref 0–0.7)
EOSINOPHIL NFR BLD AUTO: 3.9 %
ERYTHROCYTE [DISTWIDTH] IN BLOOD BY AUTOMATED COUNT: 12.2 % (ref 10–15)
HCT VFR BLD AUTO: 36.3 % (ref 35–47)
HGB BLD-MCNC: 12.3 G/DL (ref 11.7–15.7)
LYMPHOCYTES # BLD AUTO: 1.6 10E9/L (ref 0.8–5.3)
LYMPHOCYTES NFR BLD AUTO: 17.1 %
MCH RBC QN AUTO: 30.5 PG (ref 26.5–33)
MCHC RBC AUTO-ENTMCNC: 33.9 G/DL (ref 31.5–36.5)
MCV RBC AUTO: 90 FL (ref 78–100)
MONOCYTES # BLD AUTO: 0.6 10E9/L (ref 0–1.3)
MONOCYTES NFR BLD AUTO: 6.7 %
NEUTROPHILS # BLD AUTO: 6.5 10E9/L (ref 1.6–8.3)
NEUTROPHILS NFR BLD AUTO: 72.1 %
PLATELET # BLD AUTO: 227 10E9/L (ref 150–450)
RBC # BLD AUTO: 4.03 10E12/L (ref 3.8–5.2)
SPECIMEN EXP DATE BLD: NORMAL
WBC # BLD AUTO: 9.1 10E9/L (ref 4–11)

## 2021-03-19 PROCEDURE — 85025 COMPLETE CBC W/AUTO DIFF WBC: CPT | Performed by: OBSTETRICS & GYNECOLOGY

## 2021-03-19 PROCEDURE — 86780 TREPONEMA PALLIDUM: CPT | Mod: 90 | Performed by: OBSTETRICS & GYNECOLOGY

## 2021-03-19 PROCEDURE — 87389 HIV-1 AG W/HIV-1&-2 AB AG IA: CPT | Performed by: OBSTETRICS & GYNECOLOGY

## 2021-03-19 PROCEDURE — 36415 COLL VENOUS BLD VENIPUNCTURE: CPT | Performed by: OBSTETRICS & GYNECOLOGY

## 2021-03-19 PROCEDURE — 86900 BLOOD TYPING SEROLOGIC ABO: CPT | Performed by: OBSTETRICS & GYNECOLOGY

## 2021-03-19 PROCEDURE — 86901 BLOOD TYPING SEROLOGIC RH(D): CPT | Performed by: OBSTETRICS & GYNECOLOGY

## 2021-03-19 PROCEDURE — 99207 PR FIRST OB VISIT: CPT | Performed by: OBSTETRICS & GYNECOLOGY

## 2021-03-19 PROCEDURE — 99000 SPECIMEN HANDLING OFFICE-LAB: CPT | Performed by: OBSTETRICS & GYNECOLOGY

## 2021-03-19 PROCEDURE — 86762 RUBELLA ANTIBODY: CPT | Performed by: OBSTETRICS & GYNECOLOGY

## 2021-03-19 PROCEDURE — 86850 RBC ANTIBODY SCREEN: CPT | Performed by: OBSTETRICS & GYNECOLOGY

## 2021-03-19 PROCEDURE — 87086 URINE CULTURE/COLONY COUNT: CPT | Performed by: OBSTETRICS & GYNECOLOGY

## 2021-03-19 PROCEDURE — 87340 HEPATITIS B SURFACE AG IA: CPT | Performed by: OBSTETRICS & GYNECOLOGY

## 2021-03-19 NOTE — PROGRESS NOTES
INITIAL OB ASSESSMENT............................................Date: 3/19/2021                            ---------------------    Name: Shanna Smith.......................................................................Plan Number: [id number]    Age: 31 year old   : 1990  Phone: [phone] (home)   Address: [address] [place] [address]    Marital Status:    Race/Ethnicity:   Occupation:   [employer]  Partner's Name:  Souleymane Smith     OB Physician: Alpesh Rodríguez         LMP:  Patient's LMP from OB Dating Form:  Patient's last menstrual period was 2020 (approximate).  Regular menses? yes  Menses every month.   Length of menses: 5 days    Obstetrical History  ===================  OB History    Para Term  AB Living   5 3 3 0 1 3   SAB TAB Ectopic Multiple Live Births   1 0 0 0 3      # Outcome Date GA Lbr Kelechi/2nd Weight Sex Delivery Anes PTL Lv   5 Current            4 Term 19 40w4d  4.649 kg (10 lb 4 oz) M  None  KALINA      Birth Comments: OP      Name: Duncan   3 Term 10/15/15 38w0d  4.621 kg (10 lb 3 oz) F    KALINA      Name: Tanisha Cherise   2 Term 13 40w0d  3.827 kg (8 lb 7 oz) F    KALINA      Name: Geneveive   1 SAB 12 12w0d             Birth Comments: 46 XX, no D&C       Past Medical History:  Past Medical History:   Diagnosis Date     Closed fracture of metatarsal of right foot     V MT     H/O colposcopy with cervical biopsy 3/2015    Negative     History of chlamydia 2011, 2011    repeat infections     LSIL (low grade squamous intraepithelial lesion) on Pap smear 2/4/15    + HR HPV 18       Past Surgical History:  Past Surgical History:   Procedure Laterality Date     NO HISTORY OF SURGERY         Current Outpatient Medications   Medication Sig Dispense Refill     acetaminophen (TYLENOL) 325 MG tablet Take 1-2 tablets (325-650 mg) by mouth every 6 hours as needed for mild pain 1  Bottle 1     loratadine (CLARITIN) 10 MG tablet Take 1 tablet (10 mg) by mouth daily 90 tablet 3     Prenatal Vit-Fe Fumarate-FA (PRENATAL VITAMIN) 27-0.8 MG TABS Take 1 tablet by mouth daily 90 tablet 3     acetaminophen (TYLENOL) 500 MG tablet Take 1-2 tablets (500-1,000 mg) by mouth every 6 hours as needed for mild pain or fever 90 tablet 1     fluticasone (FLONASE) 50 MCG/ACT nasal spray Spray 1 spray into both nostrils daily 16 g 11       No Known Allergies    Social History     Socioeconomic History     Marital status:      Spouse name: Souleymane     Number of children: 3     Years of education: Not on file     Highest education level: Not on file   Occupational History     Employer: [employer]   Social Needs     Financial resource strain: Not on file     Food insecurity     Worry: Not on file     Inability: Not on file     Transportation needs     Medical: Not on file     Non-medical: Not on file   Tobacco Use     Smoking status: Never Smoker     Smokeless tobacco: Never Used   Substance and Sexual Activity     Alcohol use: No     Drug use: No     Sexual activity: Yes     Partners: Male   Lifestyle     Physical activity     Days per week: Not on file     Minutes per session: Not on file     Stress: Not on file   Relationships     Social connections     Talks on phone: Not on file     Gets together: Not on file     Attends Mosque service: Not on file     Active member of club or organization: Not on file     Attends meetings of clubs or organizations: Not on file     Relationship status: Not on file     Intimate partner violence     Fear of current or ex partner: Not on file     Emotionally abused: Not on file     Physically abused: Not on file     Forced sexual activity: Not on file   Other Topics Concern     Parent/sibling w/ CABG, MI or angioplasty before 65F 55M? No   Social History Narrative     Not on file       , Children  No substance abuse, environmental exposures, mental health risks and  No financial concerns,pets. Partner support.       Family History   Problem Relation Age of Onset     Asthma Mother      Hypertension Mother      Asthma Brother      Diabetes No family hx of      Breast Cancer No family hx of      Cancer - colorectal No family hx of        Past Medical History of Father of Baby:   No significant medical history     No known genetic disease in patient's 1st or 2nd degree relatives  No known genetic diseases in the FOB's 1st or 2nd degree relatives    REVIEW OF SYMPTOMS:   History Since Last Menstrual Period:    nausea, fatigue and breast tenderness     PHYSICAL EXAM:  /85 (BP Location: Right arm, Cuff Size: Adult Large)   Pulse 69   Wt 92.1 kg (203 lb)   LMP 2020 (Approximate)   SpO2 98%   BMI 30.87 kg/m    General:  WNWD female, NAD  Oriented:  X 3  Alert  PSYCH:  mentation appears normal., affect and mood normal  HEENT:  NC/AT, EOMI  NECK:  Neck supple. No adenopathy. Thyroid symmetric, normal size,, Carotids without bruits.  HEART:  RRR  LUNGS:  Clear to auscultation.  Good respiratory effort  BREASTS: declined   ABDOMEN: Benign, Soft, flat, non-tender, No masses, organomegaly and Bowel sounds normoactive FHT's heard.   VULVA: no masses or lesions seen  BUS:  [place]'s, Urethra, [place]'s normal  VAGINA:  No masses or lesions seen.   CERVIX:  Parous, closed, mobile, no discharge  UTERUS:  Normal shape, position and consistency and Nontender; 14 week size  ADNEXA:  No masses palpated, non-tender  EXTREMITIES:No cyanosis, clubbing, warm and well perfused and No edema   GAIT: normal including tandem walk, heel and toe walk.        Assessment:   IUP at 13 weeks     Plan:  Options for  testing for chromosomal and birth defects were discussed with the patient.  Diagnostic tests include CVS and Amniocentesis.  We discussed that these tests are definitive but invasive and do carry a risk of fetal loss.    Screening tests include nuchal translucency/blood marker  testing in the first trimester and quad screening in the second trimester.  We discussed that these are screening tests and not diagnostic tests and that false positives and negatives are a distinct possibility.  The patient will discuss with  and decide   We discussed physician coverage, tertiary support, diet, exercise, weight gain, schedule of visits, routine and indicated ultrasounds, and childbirth education.   Labs done today  RTC 4 weeks    Alpesh Rodríguez MD

## 2021-03-20 LAB
BACTERIA SPEC CULT: NORMAL
Lab: NORMAL
SPECIMEN SOURCE: NORMAL
T PALLIDUM AB SER QL: NONREACTIVE

## 2021-03-22 LAB
HBV SURFACE AG SERPL QL IA: NONREACTIVE
HIV 1+2 AB+HIV1 P24 AG SERPL QL IA: NONREACTIVE
RUBV IGG SERPL IA-ACNC: 42 IU/ML

## 2021-04-16 ENCOUNTER — PRENATAL OFFICE VISIT (OUTPATIENT)
Dept: OBGYN | Facility: CLINIC | Age: 31
End: 2021-04-16
Payer: COMMERCIAL

## 2021-04-16 VITALS
SYSTOLIC BLOOD PRESSURE: 139 MMHG | BODY MASS INDEX: 30.96 KG/M2 | WEIGHT: 203.6 LBS | DIASTOLIC BLOOD PRESSURE: 85 MMHG | OXYGEN SATURATION: 99 % | HEART RATE: 72 BPM

## 2021-04-16 DIAGNOSIS — Z34.82 ENCOUNTER FOR SUPERVISION OF OTHER NORMAL PREGNANCY IN SECOND TRIMESTER: Primary | ICD-10-CM

## 2021-04-16 PROCEDURE — 99207 PR PRENATAL VISIT: CPT | Performed by: OBSTETRICS & GYNECOLOGY

## 2021-04-16 NOTE — PROGRESS NOTES
17w0d   Eating well.  Nausea resolved.   Discussed genetic screening. Declines genetic testing   Ultrasound fetal survey to be scheduled.   RTC 4 weeks  Alpesh Rodríguez MD

## 2021-04-17 ENCOUNTER — HEALTH MAINTENANCE LETTER (OUTPATIENT)
Age: 31
End: 2021-04-17

## 2021-05-07 ENCOUNTER — PRENATAL OFFICE VISIT (OUTPATIENT)
Dept: OBGYN | Facility: CLINIC | Age: 31
End: 2021-05-07
Payer: COMMERCIAL

## 2021-05-07 ENCOUNTER — ANCILLARY PROCEDURE (OUTPATIENT)
Dept: ULTRASOUND IMAGING | Facility: CLINIC | Age: 31
End: 2021-05-07
Attending: OBSTETRICS & GYNECOLOGY
Payer: COMMERCIAL

## 2021-05-07 VITALS
OXYGEN SATURATION: 98 % | WEIGHT: 210.4 LBS | DIASTOLIC BLOOD PRESSURE: 85 MMHG | BODY MASS INDEX: 31.99 KG/M2 | HEART RATE: 76 BPM | SYSTOLIC BLOOD PRESSURE: 133 MMHG

## 2021-05-07 DIAGNOSIS — Z34.82 MULTIGRAVIDA IN SECOND TRIMESTER: Primary | ICD-10-CM

## 2021-05-07 DIAGNOSIS — Z34.82 ENCOUNTER FOR SUPERVISION OF OTHER NORMAL PREGNANCY IN SECOND TRIMESTER: ICD-10-CM

## 2021-05-07 PROCEDURE — 99207 PR PRENATAL VISIT: CPT | Performed by: OBSTETRICS & GYNECOLOGY

## 2021-05-07 NOTE — PROGRESS NOTES
20w0d    Doing well without issues/concerns.    Ultrasound films reviewed.  Will repeat ultrasound at next visit.   RTC 4 weeks   Labs next visit?  Alpesh Rodríguez MD

## 2021-06-08 ENCOUNTER — PRENATAL OFFICE VISIT (OUTPATIENT)
Dept: OBGYN | Facility: CLINIC | Age: 31
End: 2021-06-08
Payer: COMMERCIAL

## 2021-06-08 ENCOUNTER — ANCILLARY PROCEDURE (OUTPATIENT)
Dept: ULTRASOUND IMAGING | Facility: CLINIC | Age: 31
End: 2021-06-08
Attending: OBSTETRICS & GYNECOLOGY
Payer: COMMERCIAL

## 2021-06-08 VITALS
BODY MASS INDEX: 32.87 KG/M2 | WEIGHT: 216.2 LBS | HEART RATE: 80 BPM | DIASTOLIC BLOOD PRESSURE: 82 MMHG | SYSTOLIC BLOOD PRESSURE: 126 MMHG | OXYGEN SATURATION: 96 %

## 2021-06-08 DIAGNOSIS — Z34.82 MULTIGRAVIDA IN SECOND TRIMESTER: ICD-10-CM

## 2021-06-08 DIAGNOSIS — Z34.82 MULTIGRAVIDA IN SECOND TRIMESTER: Primary | ICD-10-CM

## 2021-06-08 PROCEDURE — 76816 OB US FOLLOW-UP PER FETUS: CPT | Performed by: RADIOLOGY

## 2021-06-08 PROCEDURE — 99207 PR PRENATAL VISIT: CPT | Performed by: OBSTETRICS & GYNECOLOGY

## 2021-06-08 NOTE — PROGRESS NOTES
24w4d    Doing well without issues/concerns.   Ultrasound today  RTC 4 weeks with labs.   Consider repeat ultrasound at about 30 weeks to check on fluid in kidneys.   Alpesh Rodríguez MD

## 2021-06-19 ENCOUNTER — NURSE TRIAGE (OUTPATIENT)
Dept: NURSING | Facility: CLINIC | Age: 31
End: 2021-06-19

## 2021-06-19 NOTE — TELEPHONE ENCOUNTER
"Shanna said she is about 27 weeks pregnant.     Being seen by Dr Rodríguez at [facility]  Planning to deliver at [place].    Calling today said notced baby kicking and she felt need to void and so voided and thought that would resolve mild discomfort she was experiencing. But did not and she has continued with some discomfort. Describes at times as \"Sharp shooting\" when sneezes it hurts or when moves hurts, but okay when still.   Has felt baby moving as usual, no more no less.    Dr Talbot on call paged to patient, patient will call back if has not connected within 20 minutes.   Connected With Shanna who said she was able to speak with on call.       Reason for Disposition    [1] Intermittent lower abdominal pain AND [2] present > 24 hours     Called OB provider who spoke with Patient.    Additional Information    Negative: Passed out (i.e., lost consciousness, collapsed and was not responding)    Negative: Shock suspected (e.g., cold/pale/clammy skin, too weak to stand, low BP, rapid pulse)    Negative: Difficult to awaken or acting confused (e.g., disoriented, slurred speech)    Negative: [1] SEVERE abdominal pain (e.g., excruciating) AND [2] constant AND [3] present > 1 hour    Negative: SEVERE vaginal bleeding (e.g., continuous red blood from vagina, large blood clots)    Negative: Sounds like a life-threatening emergency to the triager    Negative: Followed an abdomen (stomach) injury    Negative: [1] Having contractions or other symptoms of labor (such as vaginal pressure) AND [2] >= 37 weeks pregnant (i.e., term pregnancy)    Negative: [1] Having contractions or other symptoms of labor (such as vaginal pressure) AND [2] < 37 weeks pregnant (i.e., )    Negative: [1] Abdominal pain AND [2] pregnant < 20 weeks    Negative: MODERATE-SEVERE abdominal pain (e.g., interferes with normal activities, awakens from sleep)    Negative: [1] Vomiting AND [2] contains red blood or black (\"coffee ground\") " material  (Exception: few red streaks in vomit that only happened once)    Negative: Vaginal bleeding or spotting    Negative: [1] Baby moving less today (e.g., kick count < 5 in 1 hour or < 10 in 2 hours) AND [2] pregnant 23 or more weeks    Negative: Leakage of fluid from vagina    Negative: New hand or face swelling    Negative: New blurred vision or vision changes    Negative: [1] SEVERE headache AND [2] not relieved with acetaminophen (e.g., Tylenol)    Negative: [1] MILD abdominal pain (e.g., doesn't interfere with normal activities) AND [2] constant AND [3] present > 2 hours    Protocols used: PREGNANCY - ABDOMINAL PAIN GREATER THAN 20 WEEKS EGA-A-

## 2021-07-09 ENCOUNTER — PRENATAL OFFICE VISIT (OUTPATIENT)
Dept: OBGYN | Facility: CLINIC | Age: 31
End: 2021-07-09
Payer: COMMERCIAL

## 2021-07-09 VITALS
BODY MASS INDEX: 33.66 KG/M2 | SYSTOLIC BLOOD PRESSURE: 128 MMHG | HEART RATE: 82 BPM | WEIGHT: 221.4 LBS | DIASTOLIC BLOOD PRESSURE: 85 MMHG | OXYGEN SATURATION: 96 %

## 2021-07-09 DIAGNOSIS — O35.EXX0 ENCOUNTER FOR REPEAT ULTRASOUND OF FETAL PYELECTASIS, ANTEPARTUM, SINGLE OR UNSPECIFIED FETUS: ICD-10-CM

## 2021-07-09 DIAGNOSIS — Z34.82 MULTIGRAVIDA IN SECOND TRIMESTER: Primary | ICD-10-CM

## 2021-07-09 DIAGNOSIS — R10.2 PELVIC PAIN AFFECTING PREGNANCY IN THIRD TRIMESTER, ANTEPARTUM: ICD-10-CM

## 2021-07-09 DIAGNOSIS — O99.810 ABNORMAL MATERNAL GLUCOSE TOLERANCE, ANTEPARTUM: ICD-10-CM

## 2021-07-09 DIAGNOSIS — Z34.82 ENCOUNTER FOR SUPERVISION OF OTHER NORMAL PREGNANCY IN SECOND TRIMESTER: ICD-10-CM

## 2021-07-09 DIAGNOSIS — O26.893 PELVIC PAIN AFFECTING PREGNANCY IN THIRD TRIMESTER, ANTEPARTUM: ICD-10-CM

## 2021-07-09 LAB
GLUCOSE 1H P 50 G GLC PO SERPL-MCNC: 142 MG/DL (ref 60–129)
HGB BLD-MCNC: 11.6 G/DL (ref 11.7–15.7)
T PALLIDUM AB SER QL: NONREACTIVE

## 2021-07-09 PROCEDURE — 86780 TREPONEMA PALLIDUM: CPT | Mod: 90 | Performed by: OBSTETRICS & GYNECOLOGY

## 2021-07-09 PROCEDURE — 99207 PR PRENATAL VISIT: CPT | Performed by: OBSTETRICS & GYNECOLOGY

## 2021-07-09 PROCEDURE — 99N1025 PR STATISTIC OBHBG - HEMOGLOBIN: Performed by: OBSTETRICS & GYNECOLOGY

## 2021-07-09 PROCEDURE — 99000 SPECIMEN HANDLING OFFICE-LAB: CPT | Performed by: OBSTETRICS & GYNECOLOGY

## 2021-07-09 PROCEDURE — 36415 COLL VENOUS BLD VENIPUNCTURE: CPT | Performed by: OBSTETRICS & GYNECOLOGY

## 2021-07-09 PROCEDURE — 82950 GLUCOSE TEST: CPT | Performed by: OBSTETRICS & GYNECOLOGY

## 2021-07-09 NOTE — PROGRESS NOTES
29w0d   Tired.  No HA, visual changes, N/V   Good FM, No ROM, No vaginal bleeding   She has pelvic pain that is associated with the round ligaments.  She is interested in the pregnancy belt.   Labs today   Repeat ultrasound for follow up of fetal pyelectasis  RTC 2 weeks  Alpesh Rodríguez MD

## 2021-07-23 ENCOUNTER — ANCILLARY PROCEDURE (OUTPATIENT)
Dept: ULTRASOUND IMAGING | Facility: CLINIC | Age: 31
End: 2021-07-23
Attending: OBSTETRICS & GYNECOLOGY
Payer: COMMERCIAL

## 2021-07-23 ENCOUNTER — LAB (OUTPATIENT)
Dept: LAB | Facility: CLINIC | Age: 31
End: 2021-07-23

## 2021-07-23 DIAGNOSIS — O99.810 ABNORMAL MATERNAL GLUCOSE TOLERANCE, ANTEPARTUM: ICD-10-CM

## 2021-07-23 DIAGNOSIS — O35.EXX0 ENCOUNTER FOR REPEAT ULTRASOUND OF FETAL PYELECTASIS, ANTEPARTUM, SINGLE OR UNSPECIFIED FETUS: ICD-10-CM

## 2021-07-23 LAB
GESTATIONAL GTT 1 HR POST DOSE: 157 MG/DL (ref 60–179)
GESTATIONAL GTT 2 HR POST DOSE: 145 MG/DL (ref 60–154)
GESTATIONAL GTT 3 HR POST DOSE: 102 MG/DL (ref 60–139)
GLUCOSE P FAST SERPL-MCNC: 77 MG/DL (ref 60–94)

## 2021-07-23 PROCEDURE — 76816 OB US FOLLOW-UP PER FETUS: CPT | Performed by: RADIOLOGY

## 2021-07-23 PROCEDURE — 82951 GLUCOSE TOLERANCE TEST (GTT): CPT

## 2021-07-23 PROCEDURE — 36415 COLL VENOUS BLD VENIPUNCTURE: CPT

## 2021-07-23 PROCEDURE — 82952 GTT-ADDED SAMPLES: CPT

## 2021-08-12 ENCOUNTER — TRANSFERRED RECORDS (OUTPATIENT)
Dept: HEALTH INFORMATION MANAGEMENT | Facility: CLINIC | Age: 31
End: 2021-08-12

## 2021-08-13 ENCOUNTER — PRENATAL OFFICE VISIT (OUTPATIENT)
Dept: OBGYN | Facility: CLINIC | Age: 31
End: 2021-08-13
Payer: COMMERCIAL

## 2021-08-13 VITALS
BODY MASS INDEX: 35.4 KG/M2 | OXYGEN SATURATION: 95 % | HEART RATE: 92 BPM | DIASTOLIC BLOOD PRESSURE: 89 MMHG | WEIGHT: 232.8 LBS | SYSTOLIC BLOOD PRESSURE: 137 MMHG

## 2021-08-13 DIAGNOSIS — Z34.83 MULTIGRAVIDA IN THIRD TRIMESTER: Primary | ICD-10-CM

## 2021-08-13 DIAGNOSIS — Z23 NEED FOR TDAP VACCINATION: ICD-10-CM

## 2021-08-13 PROCEDURE — 99207 PR PRENATAL VISIT: CPT | Performed by: OBSTETRICS & GYNECOLOGY

## 2021-08-13 PROCEDURE — 90715 TDAP VACCINE 7 YRS/> IM: CPT | Performed by: OBSTETRICS & GYNECOLOGY

## 2021-08-13 PROCEDURE — 90471 IMMUNIZATION ADMIN: CPT | Performed by: OBSTETRICS & GYNECOLOGY

## 2021-08-13 NOTE — PROGRESS NOTES
34w0d    Tired.  No HA, visual changes, N/V  Tdap today  She has repeat ultrasound and MRI in about 3 weeks  RTC 2 weeks.   Alpesh Rodríguez MD

## 2021-08-17 ENCOUNTER — MYC MEDICAL ADVICE (OUTPATIENT)
Dept: OBGYN | Facility: CLINIC | Age: 31
End: 2021-08-17

## 2021-08-17 DIAGNOSIS — N83.291 COMPLEX CYST OF RIGHT OVARY: Primary | ICD-10-CM

## 2021-08-17 NOTE — TELEPHONE ENCOUNTER
Pt currently 34w4d, last seen 8/13/2021.    Pt questioning now if the pain she is having is not round ligament pain but actually due to her cyst noted. Pt states pain is sharp and on the R) side and worsened with full bladder and movement.    Pt rates pain 5-8/10, denies vaginal bleeding, LOF, decreased fetal movement or contractions. Pt aware to reach out for worsening symptoms.     Pt asking about MRI she was advised to have, RN does not see orders. Routing to provider for advisement.    Diana Coyle RN on 8/19/2021 at 8:29 AM

## 2021-08-19 NOTE — TELEPHONE ENCOUNTER
Patient has not been called about the MRI and I have called her and I placed the order.    Alpesh Rodríguez MD

## 2021-08-27 ENCOUNTER — PRENATAL OFFICE VISIT (OUTPATIENT)
Dept: OBGYN | Facility: CLINIC | Age: 31
End: 2021-08-27
Payer: COMMERCIAL

## 2021-08-27 VITALS
SYSTOLIC BLOOD PRESSURE: 124 MMHG | BODY MASS INDEX: 36.28 KG/M2 | DIASTOLIC BLOOD PRESSURE: 80 MMHG | WEIGHT: 238.6 LBS | OXYGEN SATURATION: 96 % | HEART RATE: 86 BPM

## 2021-08-27 DIAGNOSIS — Z36.85 ANTENATAL SCREENING FOR STREPTOCOCCUS B: ICD-10-CM

## 2021-08-27 DIAGNOSIS — Z34.83 MULTIGRAVIDA IN THIRD TRIMESTER: Primary | ICD-10-CM

## 2021-08-27 PROCEDURE — 87653 STREP B DNA AMP PROBE: CPT | Performed by: OBSTETRICS & GYNECOLOGY

## 2021-08-27 PROCEDURE — 99207 PR PRENATAL VISIT: CPT | Performed by: OBSTETRICS & GYNECOLOGY

## 2021-08-27 NOTE — PROGRESS NOTES
36w0d    No HA, visual changes, N/V   Labor plan and warning s/s discussed.   She has brother's wedding this weekend so she is anxious.  She wonders if the BP might be elevated due to that.   Her Repeat BP is normal   Routine AG. See flowsheet.   GBS checked today   RTC 1 wk

## 2021-08-28 LAB
GP B STREP DNA SPEC QL NAA+PROBE: POSITIVE
PATIENT PENICILLIN, AMOXICILLIN, CEPHALOSPORINS ALLERGY: NO

## 2021-08-31 ENCOUNTER — TELEPHONE (OUTPATIENT)
Dept: OBGYN | Facility: CLINIC | Age: 31
End: 2021-08-31

## 2021-08-31 NOTE — TELEPHONE ENCOUNTER
Deepali TORRES from [facility] called to verify pts upcoming MRI appt for 9/3/21. Nothing more needed.    Queta Multani, ABHILASHN RN

## 2021-09-02 ENCOUNTER — TRANSFERRED RECORDS (OUTPATIENT)
Dept: HEALTH INFORMATION MANAGEMENT | Facility: CLINIC | Age: 31
End: 2021-09-02

## 2021-09-03 ENCOUNTER — LAB (OUTPATIENT)
Dept: LAB | Facility: CLINIC | Age: 31
End: 2021-09-03

## 2021-09-03 ENCOUNTER — TELEPHONE (OUTPATIENT)
Dept: OBGYN | Facility: CLINIC | Age: 31
End: 2021-09-03

## 2021-09-03 ENCOUNTER — ANCILLARY PROCEDURE (OUTPATIENT)
Dept: MRI IMAGING | Facility: CLINIC | Age: 31
End: 2021-09-03
Attending: OBSTETRICS & GYNECOLOGY
Payer: COMMERCIAL

## 2021-09-03 DIAGNOSIS — N83.291 COMPLEX CYST OF RIGHT OVARY: ICD-10-CM

## 2021-09-03 LAB — LDH SERPL L TO P-CCNC: 129 U/L (ref 81–234)

## 2021-09-03 PROCEDURE — 82378 CARCINOEMBRYONIC ANTIGEN: CPT

## 2021-09-03 PROCEDURE — 99000 SPECIMEN HANDLING OFFICE-LAB: CPT

## 2021-09-03 PROCEDURE — 72195 MRI PELVIS W/O DYE: CPT | Mod: TC | Performed by: RADIOLOGY

## 2021-09-03 PROCEDURE — 83520 IMMUNOASSAY QUANT NOS NONAB: CPT | Mod: 90

## 2021-09-03 PROCEDURE — 36415 COLL VENOUS BLD VENIPUNCTURE: CPT

## 2021-09-03 PROCEDURE — 83615 LACTATE (LD) (LDH) ENZYME: CPT

## 2021-09-03 PROCEDURE — 86304 IMMUNOASSAY TUMOR CA 125: CPT

## 2021-09-03 PROCEDURE — 82105 ALPHA-FETOPROTEIN SERUM: CPT

## 2021-09-03 NOTE — TELEPHONE ENCOUNTER
Dr. Rodríguez called clinic RN to assist in contacting GYN/Onc provider to speak with today regarding pt recent imaging.  Dr. Lopes is oncall for Gyn/Onc and to call Dr. Rodríguez.    ABHILASH MilesN RN

## 2021-09-04 LAB
AFP SERPL-MCNC: 223.4 UG/L (ref 0–8)
CANCER AG125 SERPL-ACNC: 42 U/ML (ref 0–30)
CEA SERPL-MCNC: <0.5 UG/L (ref 0–2.5)

## 2021-09-06 LAB — INHIBIN B SERPL-MCNC: 12 PG/ML

## 2021-09-09 NOTE — PROGRESS NOTES
RECORDS STATUS - ALL OTHER DIAGNOSIS      RECORDS RECEIVED FROM: [facility]   DATE RECEIVED: 9/13/2021   NOTES STATUS DETAILS   OFFICE NOTE from referring provider Complete Epic   Alpesh Rodríguez MD   OFFICE NOTE from medical oncologist N/A    DISCHARGE SUMMARY from hospital N/A    DISCHARGE REPORT from the ER     OPERATIVE REPORT N/A    MEDICATION LIST Complete [facility]   CLINICAL TRIAL TREATMENTS TO DATE     LABS     PATHOLOGY REPORTS N/A    ANYTHING RELATED TO DIAGNOSIS Complete Labs last updated on 8/3/2021    GENONOMIC TESTING     TYPE:     IMAGING (NEED IMAGES & REPORT)     CT SCANS     MRI Complete MRI Pelvis 9/3/2021   MAMMO     ULTRASOUND     PET

## 2021-09-10 ENCOUNTER — PRENATAL OFFICE VISIT (OUTPATIENT)
Dept: OBGYN | Facility: CLINIC | Age: 31
End: 2021-09-10
Payer: COMMERCIAL

## 2021-09-10 VITALS
BODY MASS INDEX: 36.46 KG/M2 | HEART RATE: 111 BPM | SYSTOLIC BLOOD PRESSURE: 125 MMHG | DIASTOLIC BLOOD PRESSURE: 83 MMHG | OXYGEN SATURATION: 95 % | WEIGHT: 239.8 LBS

## 2021-09-10 DIAGNOSIS — Z34.83 MULTIGRAVIDA IN THIRD TRIMESTER: Primary | ICD-10-CM

## 2021-09-10 DIAGNOSIS — N83.291 COMPLEX CYST OF RIGHT OVARY: ICD-10-CM

## 2021-09-10 PROCEDURE — 99207 PR PRENATAL VISIT: CPT | Performed by: OBSTETRICS & GYNECOLOGY

## 2021-09-10 NOTE — PROGRESS NOTES
38w0d    No HA, visual changes, N/V  She has appointment Monday with Gyn/Onc about the large complex cyst.  Labs reviewed.    Patient and I will talk early next week for delivery plans.   Alpesh Rodríguez MD

## 2021-09-13 ENCOUNTER — ONCOLOGY VISIT (OUTPATIENT)
Dept: ONCOLOGY | Facility: CLINIC | Age: 31
End: 2021-09-13
Payer: COMMERCIAL

## 2021-09-13 ENCOUNTER — TELEPHONE (OUTPATIENT)
Dept: OBGYN | Facility: CLINIC | Age: 31
End: 2021-09-13

## 2021-09-13 ENCOUNTER — PRE VISIT (OUTPATIENT)
Dept: ONCOLOGY | Facility: CLINIC | Age: 31
End: 2021-09-13

## 2021-09-13 VITALS
DIASTOLIC BLOOD PRESSURE: 85 MMHG | BODY MASS INDEX: 36.45 KG/M2 | HEIGHT: 68 IN | HEART RATE: 111 BPM | WEIGHT: 240.5 LBS | SYSTOLIC BLOOD PRESSURE: 127 MMHG | OXYGEN SATURATION: 99 %

## 2021-09-13 DIAGNOSIS — D49.59: Primary | ICD-10-CM

## 2021-09-13 DIAGNOSIS — O34.83: Primary | ICD-10-CM

## 2021-09-13 PROCEDURE — 99205 OFFICE O/P NEW HI 60 MIN: CPT | Performed by: OBSTETRICS & GYNECOLOGY

## 2021-09-13 RX ORDER — HEPARIN SODIUM 10000 [USP'U]/ML
5000 INJECTION, SOLUTION INTRAVENOUS; SUBCUTANEOUS
Status: CANCELLED | OUTPATIENT
Start: 2021-09-13

## 2021-09-13 ASSESSMENT — MIFFLIN-ST. JEOR: SCORE: 1854.4

## 2021-09-13 NOTE — NURSING NOTE
"Oncology Rooming Note    September 13, 2021 8:25 AM   Shanna Smith is a 31 year old female who presents for:    Chief Complaint   Patient presents with     Oncology Clinic Visit     Large complex Cyst - 38 Weeks pregnant     Initial Vitals: /85 (BP Location: Right arm, Patient Position: Chair, Cuff Size: Adult Large)   Pulse 111   Ht 1.727 m (5' 8\")   Wt 109.1 kg (240 lb 8 oz)   LMP 12/18/2020 (Approximate)   SpO2 99%   BMI 36.57 kg/m   Estimated body mass index is 36.57 kg/m  as calculated from the following:    Height as of this encounter: 1.727 m (5' 8\").    Weight as of this encounter: 109.1 kg (240 lb 8 oz). Body surface area is 2.29 meters squared.  Data Unavailable Comment: Data Unavailable   Patient's last menstrual period was 12/18/2020 (approximate).  Allergies reviewed: Yes  Medications reviewed: Yes    Medications: Medication refills not needed today.  Pharmacy name entered into [facility]: [facility] - [place], [place] - [address] [place] [address].    Clinical concerns: Yes Dr. Castillo was notified.      Deidre Dunbar CMA              "

## 2021-09-13 NOTE — TELEPHONE ENCOUNTER
Pt calling in today after seeing gyn/onc this morning. Pt currently 38w3d asking about timing of induction.    Pt seen with gyn/onc today for large complex cyst, plan was to review today's  OV for further recommendations regarding IOL.     RN routing to provider and provider pool for recommendations, RN will contact pt back with plan once established.    Diana Coyle RN on 9/13/2021 at 10:11 AM

## 2021-09-13 NOTE — PROGRESS NOTES
Gynecologic Oncology Clinic - New Patient    Referring provider:    Alpesh Rodríguez MD  [address] [place] [address]    Patient: Shanna Smith  : 1990    Date of Visit: Sep 13, 2021     Reason for visit: ovarian cyst in pregnancy    History of Present Illness:  Shanna Smith is a 31 year old  at 38w3d who presents due to enlarging ovarian cyst in pregnancy.    Cyst was first noted on 20wk ultrasound as a complex cyst from the right ovary which at that time measured about 7.5cm in size. Since that time it has been monitored on subsequent ultrasounds. It was noted to be increasing in size. She recently underwent a pelvic MRI where it was measured at least 19cm in size, but extending beyond the pelvic field of view. This is largely multi-cystic. Tumor markers were obtained which showed  42 and CEA <0.5.      Review of Systems:  As per HPI, otherwise non-contributory.     OB/Gynecologic History:  ,  x 3  Currently pregnant at 38w3d. No complications.   Last Pap Smear: 2018 negative/co-testing not done. History of abnormal: LSIL/HPV 18+ in , no history of high grade    Past Medical History:   Diagnosis Date     Closed fracture of metatarsal of right foot     V MT     H/O colposcopy with cervical biopsy 3/2015    Negative     History of chlamydia 2011, 2011    repeat infections     LSIL (low grade squamous intraepithelial lesion) on Pap smear 2/4/15    + HR HPV 18       Past Surgical History:   Procedure Laterality Date     NO HISTORY OF SURGERY          Social History     Tobacco Use     Smoking status: Never Smoker     Smokeless tobacco: Never Used   Substance Use Topics     Alcohol use: No     Drug use: No        Family History   Problem Relation Age of Onset     Asthma Mother      Hypertension Mother      Asthma Brother      Breast Cancer Paternal Grandmother      Diabetes No family hx of      Cancer - colorectal No family hx of        Current  "Outpatient Medications   Medication Sig Dispense Refill     acetaminophen (TYLENOL) 500 MG tablet Take 1-2 tablets (500-1,000 mg) by mouth every 6 hours as needed for mild pain or fever 90 tablet 1     loratadine (CLARITIN) 10 MG tablet Take 1 tablet (10 mg) by mouth daily 90 tablet 3     Prenatal Vit-Fe Fumarate-FA (PRENATAL VITAMIN) 27-0.8 MG TABS Take 1 tablet by mouth daily 90 tablet 3        No Known Allergies      Physical Exam:   /85 (BP Location: Right arm, Patient Position: Chair, Cuff Size: Adult Large)   Pulse 111   Ht 1.727 m (5' 8\")   Wt 109.1 kg (240 lb 8 oz)   LMP 12/18/2020 (Approximate)   SpO2 99%   BMI 36.57 kg/m    General appearance: Alert and oriented, no acute distress, well groomed  Abd: Gravid abdomen  Pelvic is deferred due to pregnant state.     Labs/Pathology:   Results for FROYLAN OBRIEN (MRN [id number]) as of 9/13/2021 08:01   Ref. Range 9/3/2021 16:11 9/3/2021 16:17   Alpha Fetoprotein Latest Ref Range: 0.0 - 8.0 ug/L 223.4 (H)     Latest Ref Range: 0 - 30 U/mL 42 (H)    Inhibin B Latest Ref Range: 8 - 223 pg/mL  12   Lactate Dehydrogenase Latest Ref Range: 81 - 234 U/L  129   CEA Latest Ref Range: 0.0 - 2.5 ug/L <0.5    Reviewed most recent Pap smear results    Imaging:    Results for orders placed in visit on 09/03/21    MR Pelvis (GYN) wo Contrast    Narrative  MR PELVIS (GYN) WITHOUT CONTRAST September 3, 2021 8:42 AM    INDICATION: Adnexal mass suspected. Complex cyst of right ovary.    COMPARISON: 7/23/2021, 6/8/2021.    TECHNIQUE: Routine MRI female pelvis protocol including T1 in/out  phase, diffusion, thin section high resolution multiplane T2, and post  contrast T1.  CONTRAST: <47167:Contrast>    FINDINGS:    UTERUS: Single intrauterine pregnancy noted, not completely included  on the study.    ADNEXA: At the margin of the study, there is a large, complex, cystic  mass presumed to be arising from the right ovary that measures at  least 19.1 x 7.0 cm in the " axial plane (series 3, image 2). This  lesion is incompletely included on the study. Left ovary not  definitely visualized.    ADDITIONAL FINDINGS: No ascites or lymphadenopathy. No peritoneal  nodules are demonstrated.    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 This mass is  incompletely included on the study due to gravid uterus.    PREMA SWANSON MD      SYSTEM ID:  [id number]           Assessment:  Shanna Smith is a 31 year old  at 38w3d gestation with enlarging ovarian cyst here for management recommendations.    Plan:   1. Ovarian cyst in pregnancy: We discussed the differential for adnexal masses, which included benign, borderline, and malignant conditions. We reviewed that we cannot know for certain which a mass is without surgery to remove the mass; however, we can use other factors such as age, family history, imaging, and lab results to guide our differential. In pregnancy specifically, the level of concern for cancer determines recommendations for treatment. In reviewing her ultrasound and MRI images, I think invasive cancer is less likely than either a benign or borderline mass. This is based largely upon the appearance of the mass as mostly a multi-cystic structure with minimal solid areas. The growth in the mass has been largely the cystic portion over the pregnancy. Her tumor markers are reassuring given no more than expected elevations in pregnancy. There are no other concerning findings on her imaging, albeit slightly limited due to late gestation.  2. Based upon my review of her history and images, I would recommend management of the pregnancy according to usual obstetrical practices. We will defer any intervention for the mass until 4-6 weeks post-partum. If she were to need a  delivery, I will defer to the delivering provider regarding what they are comfortable performing.  Certainly if they feel they can safely remove the ovary at the time of  section, they should do so and we can make a plan based upon final pathology. We discussed that this could mean additional surgery, however that can usually be accomplished laparoscopically.  3. I will plan to see her in clinic 4 weeks post-partum with plan for surgery ~6 weeks post-partum which will likely require oophorectomy given the size of the mass. We can finalize surgical plan at her follow-up visit, but we briefly discussed goal of intact removal. Based on post-partum exam, we can discuss diagnostic laparoscopy with possible laparoscopic removal versus open procedure with goal for SILS if safe to minimize surgical risks and recovery time given she will be post-partum.     I spent a total of 75 minutes on the care of Shanna Smith on the day of service including 55 minutes face to face time and the remainder in chart review, care coordination, and documentation on the day of service.    Ramirez Castillo MD    Division of Gynecologic Oncology  Department of Ob/Gyn and Women's Health  [facility]

## 2021-09-13 NOTE — LETTER
2021         RE: Shanna Smith  [address] [facility] [address]        Dear Colleague,    Thank you for referring your patient, Shanna Smith, to the [facility]. Please see a copy of my visit note below.    Gynecologic Oncology Clinic - New Patient    Referring provider:    Alpesh Rodríguez MD  [address] [facility] [place] [address]    Patient: Shanna Smith  : 1990    Date of Visit: Sep 13, 2021     Reason for visit: ovarian cyst in pregnancy    History of Present Illness:  Shanna Smith is a 31 year old  at 38w3d who presents due to enlarging ovarian cyst in pregnancy.    Cyst was first noted on 20wk ultrasound as a complex cyst from the right ovary which at that time measured about 7.5cm in size. Since that time it has been monitored on subsequent ultrasounds. It was noted to be increasing in size. She recently underwent a pelvic MRI where it was measured at least 19cm in size, but extending beyond the pelvic field of view. This is largely multi-cystic. Tumor markers were obtained which showed  42 and CEA <0.5.      Review of Systems:  As per HPI, otherwise non-contributory.     OB/Gynecologic History:  ,  x 3  Currently pregnant at 38w3d. No complications.   Last Pap Smear: 2018 negative/co-testing not done. History of abnormal: LSIL/HPV 18+ in , no history of high grade    Past Medical History:   Diagnosis Date     Closed fracture of metatarsal of right foot     V MT     H/O colposcopy with cervical biopsy 3/2015    Negative     History of chlamydia 2011, 2011    repeat infections     LSIL (low grade squamous intraepithelial lesion) on Pap smear 2/4/15    + HR HPV 18       Past Surgical History:   Procedure Laterality Date     NO HISTORY OF SURGERY          Social History     Tobacco Use     Smoking status: Never Smoker     Smokeless tobacco: Never Used   Substance Use Topics     Alcohol use: No  "    Drug use: No        Family History   Problem Relation Age of Onset     Asthma Mother      Hypertension Mother      Asthma Brother      Breast Cancer Paternal Grandmother      Diabetes No family hx of      Cancer - colorectal No family hx of        Current Outpatient Medications   Medication Sig Dispense Refill     acetaminophen (TYLENOL) 500 MG tablet Take 1-2 tablets (500-1,000 mg) by mouth every 6 hours as needed for mild pain or fever 90 tablet 1     loratadine (CLARITIN) 10 MG tablet Take 1 tablet (10 mg) by mouth daily 90 tablet 3     Prenatal Vit-Fe Fumarate-FA (PRENATAL VITAMIN) 27-0.8 MG TABS Take 1 tablet by mouth daily 90 tablet 3        No Known Allergies      Physical Exam:   /85 (BP Location: Right arm, Patient Position: Chair, Cuff Size: Adult Large)   Pulse 111   Ht 1.727 m (5' 8\")   Wt 109.1 kg (240 lb 8 oz)   LMP 12/18/2020 (Approximate)   SpO2 99%   BMI 36.57 kg/m    General appearance: Alert and oriented, no acute distress, well groomed  Abd: Gravid abdomen  Pelvic is deferred due to pregnant state.     Labs/Pathology:   Results for FROYLAN OBRIEN (MRN [id number]) as of 9/13/2021 08:01   Ref. Range 9/3/2021 16:11 9/3/2021 16:17   Alpha Fetoprotein Latest Ref Range: 0.0 - 8.0 ug/L 223.4 (H)     Latest Ref Range: 0 - 30 U/mL 42 (H)    Inhibin B Latest Ref Range: 8 - 223 pg/mL  12   Lactate Dehydrogenase Latest Ref Range: 81 - 234 U/L  129   CEA Latest Ref Range: 0.0 - 2.5 ug/L <0.5    Reviewed most recent Pap smear results    Imaging:    Results for orders placed in visit on 09/03/21    MR Pelvis (GYN) wo Contrast    Narrative  MR PELVIS (GYN) WITHOUT CONTRAST September 3, 2021 8:42 AM    INDICATION: Adnexal mass suspected. Complex cyst of right ovary.    COMPARISON: 7/23/2021, 6/8/2021.    TECHNIQUE: Routine MRI female pelvis protocol including T1 in/out  phase, diffusion, thin section high resolution multiplane T2, and post  contrast T1.  CONTRAST: " <52638:Contrast>    FINDINGS:    UTERUS: Single intrauterine pregnancy noted, not completely included  on the study.    ADNEXA: At the margin of the study, there is a large, complex, cystic  mass presumed to be arising from the right ovary that measures at  least 19.1 x 7.0 cm in the axial plane (series 3, image 2). This  lesion is incompletely included on the study. Left ovary not  definitely visualized.    ADDITIONAL FINDINGS: No ascites or lymphadenopathy. No peritoneal  nodules are demonstrated.    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 This mass is  incompletely included on the study due to gravid uterus.    PREMA SWANSON MD      SYSTEM ID:  [id number]           Assessment:  Shanna Smith is a 31 year old  at 38w3d gestation with enlarging ovarian cyst here for management recommendations.    Plan:   1. Ovarian cyst in pregnancy: We discussed the differential for adnexal masses, which included benign, borderline, and malignant conditions. We reviewed that we cannot know for certain which a mass is without surgery to remove the mass; however, we can use other factors such as age, family history, imaging, and lab results to guide our differential. In pregnancy specifically, the level of concern for cancer determines recommendations for treatment. In reviewing her ultrasound and MRI images, I think invasive cancer is less likely than either a benign or borderline mass. This is based largely upon the appearance of the mass as mostly a multi-cystic structure with minimal solid areas. The growth in the mass has been largely the cystic portion over the pregnancy. Her tumor markers are reassuring given no more than expected elevations in pregnancy. There are no other concerning findings on her imaging, albeit slightly limited due to late gestation.  2. Based upon my review of her history and images, I would  recommend management of the pregnancy according to usual obstetrical practices. We will defer any intervention for the mass until 4-6 weeks post-partum. If she were to need a  delivery, I will defer to the delivering provider regarding what they are comfortable performing. Certainly if they feel they can safely remove the ovary at the time of  section, they should do so and we can make a plan based upon final pathology. We discussed that this could mean additional surgery, however that can usually be accomplished laparoscopically.  3. I will plan to see her in clinic 4 weeks post-partum with plan for surgery ~6 weeks post-partum which will likely require oophorectomy given the size of the mass. We can finalize surgical plan at her follow-up visit, but we briefly discussed goal of intact removal. Based on post-partum exam, we can discuss diagnostic laparoscopy with possible laparoscopic removal versus open procedure with goal for SILS if safe to minimize surgical risks and recovery time given she will be post-partum.     I spent a total of 75 minutes on the care of Shanna Smith on the day of service including 55 minutes face to face time and the remainder in chart review, care coordination, and documentation on the day of service.    Ramirez Castillo MD    Division of Gynecologic Oncology  Department of Ob/Gyn and Women's Health  [facility]       Again, thank you for allowing me to participate in the care of your patient.        Sincerely,        Ramirez Castillo MD

## 2021-09-14 NOTE — TELEPHONE ENCOUNTER
It appears [facility] has not recommended an induction due to the ovarian mass.  They mentioned management of the pregnancy according to usual obstetrical practices.  I recommend she make an appointment for this week for a cervical check and if she is favorable and desires an induction, one can be scheduled at that time.    Maggie Talbot,

## 2021-09-14 NOTE — PATIENT INSTRUCTIONS
We will plan to follow-up about 4 weeks after delivery. This will allow for an exam to determine approximate size and position of the ovarian mass at that time and then we can discuss/make a definitive surgical plan. I am hopeful that laparoscopic surgery may be possible, but we can discuss more at the follow-up appointment.     We can plan for surgery as soon as 6 weeks after delivery. We can base the specific timing on how you feel at that time and on recovery from delivery.

## 2021-09-16 ENCOUNTER — TELEPHONE (OUTPATIENT)
Dept: OBGYN | Facility: CLINIC | Age: 31
End: 2021-09-16

## 2021-09-16 ENCOUNTER — PRENATAL OFFICE VISIT (OUTPATIENT)
Dept: OBGYN | Facility: CLINIC | Age: 31
End: 2021-09-16
Payer: COMMERCIAL

## 2021-09-16 VITALS
HEART RATE: 93 BPM | BODY MASS INDEX: 37.13 KG/M2 | OXYGEN SATURATION: 97 % | SYSTOLIC BLOOD PRESSURE: 127 MMHG | WEIGHT: 244.2 LBS | DIASTOLIC BLOOD PRESSURE: 83 MMHG

## 2021-09-16 DIAGNOSIS — N83.291 COMPLEX CYST OF RIGHT OVARY: ICD-10-CM

## 2021-09-16 DIAGNOSIS — Z34.83 MULTIGRAVIDA IN THIRD TRIMESTER: Primary | ICD-10-CM

## 2021-09-16 PROCEDURE — 99207 PR PRENATAL VISIT: CPT | Performed by: OBSTETRICS & GYNECOLOGY

## 2021-09-16 NOTE — PROGRESS NOTES
38w6d    No HA, visual changes, N/V etc.    Labor plan and warning s/s discussed.   GBS positive.  She wants to have a delivery as soon as possible due to the cyst.  Induction is scheduled for tomorrow.  She will call L&D in am.    Alpesh Rodríguez MD

## 2021-09-16 NOTE — TELEPHONE ENCOUNTER
Pt currently 38w9d, last seen today.    Pt calling with concerns for lower R) abdominal pain, rating 7/10.    Pt denies LOF, cramping, contractions, vaginal bleeding, decreased fetal movement, changes in BM, headaches or vision changes.    RN spoke with Dr. Rodríguez in clinic who advised if she has pain that is unmanageable at home then to go in for concerns of ovarian torsion.    RN called pt back and relayed to rest at home and to call L&D in the morning for her induction but to be seen in L&D sooner should she have worsening symptoms.    Patient verbalized understanding and agreed to plan.     Patient was given the opportunity to ask additional questions and have them answered. Patient had no further questions.     Diana Coyle RN on 9/16/2021 at 4:13 PM

## 2021-09-24 ENCOUNTER — DOCUMENTATION ONLY (OUTPATIENT)
Dept: ONCOLOGY | Facility: CLINIC | Age: 31
End: 2021-09-24

## 2021-09-24 NOTE — PROGRESS NOTES
RNCC: Leonides Reyes    Surgery is scheduled with Dr. Castillo on 11/5 at [facility].  Scheduled per patient.    H&P: to be completed by PCP or Surgeon - see office visit note     COVID-19 test: 11/3 at [facility]     Post-op: will be scheduled by the clinic.      The RN completed the education regarding the surgery.     Patient will receive a phone call from pre-admission nurses 1-2 days prior to surgery with arrival and start time.    Patient will complete COVID-19 test that was scheduled by surgical coordinator 2-4 days prior to surgery.     I sent a MyChart to confirm the information above, will call if the patient requests.

## 2021-09-26 ENCOUNTER — HEALTH MAINTENANCE LETTER (OUTPATIENT)
Age: 31
End: 2021-09-26

## 2021-09-27 ENCOUNTER — TELEPHONE (OUTPATIENT)
Dept: OBGYN | Facility: CLINIC | Age: 31
End: 2021-09-27

## 2021-09-27 NOTE — CONFIDENTIAL NOTE
Reason for Call:  Form, our goal is to have forms completed with 72 hours, however, some forms may require a visit or additional information.    Type of letter, form or note:  FMLA    Who is the form from?: Patient    Where did the form come from: Patient or family brought in       What clinic location was the form placed at?: [facility]    Where the form was placed: Put in doctor mail box Box/Folder    What number is listed as a contact on the form?: [phone]       Additional comments: Patient would like for you to fax form first to [place] [employer], then call her to  original copy.    Call taken on 9/27/2021 at 10:11 AM by Glo Oseguera

## 2021-09-29 NOTE — TELEPHONE ENCOUNTER
FMLA forms are filled out and faxed.  I will keep copy in [place] [facility] department and sent original to scan.  Called patient.  EBONY Vines 9/29/2021

## 2021-10-07 DIAGNOSIS — Z11.59 ENCOUNTER FOR SCREENING FOR OTHER VIRAL DISEASES: ICD-10-CM

## 2021-10-12 ENCOUNTER — ONCOLOGY VISIT (OUTPATIENT)
Dept: ONCOLOGY | Facility: CLINIC | Age: 31
End: 2021-10-12
Payer: COMMERCIAL

## 2021-10-12 VITALS
OXYGEN SATURATION: 95 % | SYSTOLIC BLOOD PRESSURE: 126 MMHG | TEMPERATURE: 98.2 F | DIASTOLIC BLOOD PRESSURE: 78 MMHG | WEIGHT: 221.6 LBS | HEIGHT: 69 IN | HEART RATE: 67 BPM | RESPIRATION RATE: 16 BRPM | BODY MASS INDEX: 32.82 KG/M2

## 2021-10-12 DIAGNOSIS — N83.299 COMPLEX OVARIAN CYST: Primary | ICD-10-CM

## 2021-10-12 PROCEDURE — 99212 OFFICE O/P EST SF 10 MIN: CPT | Performed by: OBSTETRICS & GYNECOLOGY

## 2021-10-12 ASSESSMENT — MIFFLIN-ST. JEOR: SCORE: 1784.55

## 2021-10-12 ASSESSMENT — PAIN SCALES - GENERAL: PAINLEVEL: NO PAIN (0)

## 2021-10-12 NOTE — NURSING NOTE
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    Procedure:  Diagnostic laparoscopy, single-incision laparoscopic salpingo-oophorectomy, possible conversion to open oophorectomy    Person(s) involved in teaching:  Patient    Motivation Level:    Asks Questions:   Yes  Eager to Learn:  Yes  Cooperative:  Yes  Receptive (willing/able to accept information):  Yes  Comments:  None    Patient demonstrates understanding of the following:  Reason for the appointment, diagnosis and treatment plan:  Yes  Knowledge of proper use of medications and conditions for which they are ordered (with special attention to potential side effects or drug interactions):  Yes  Which situations necessitate calling provider and whom to contact:  Yes    Teaching Concerns:  No    Education/Instructional Materials Used/Given:     Before Your Surgery Booklet ([place])  Showering Before Surgery, CHG prep provided  Adult Pain Assessment Tool  Hysterectomy Guidelines   Home Care After Gynecologic Surgery  [facility] ([place])  Accommodations Brochure   Phone numbers for [facility] and After-Hours Line provided to patient    Pre-op tests:     EKG: Not ordered    Labs: Patient will have labs drawn the day of surgery, per Dr. Castillo.    Chest X-Ray: Not ordered.    COVID-19 Testing: Ordered; appointment scheduled for 11/03/2021 ([facility]).    Other: N/A    Pre-op appointment: Dr. Castillo will complete patient's pre-op H&P exam.    Post-op appointment: 11/23/2021 at 9:30 am with Dr. Castillo ([facility])    Time spent teaching with patient:  20 minutes    Hysterectomy acknowledgement form, as well as consent for sterilization form, signed by patient today, and were faxed to [facility]-Admissions at fax number [phone].  Patient will sign surgery e-consents the day of surgery.    Aren  Eric, RN, BSN, OCN  Oncology Care Coordinator  [facility]

## 2021-10-12 NOTE — PROGRESS NOTES
Gynecologic Oncology Clinic    Patient: Shanna Smith  : 1990    Date of Visit: Oct 12, 2021     Reason for visit: ovarian cyst in pregnancy    History of Present Illness:  Shanna Smith is a 31 year old  s/p vaginal delivery 2021 who returns to discuss surgery for ovarian cyst noted during pregnancy.     Cyst was first noted on 20wk ultrasound as a complex cyst from the right ovary which at that time measured about 7.5cm in size. It enlarged on subsequent imaging and ultimately she had a pelvic MRI where it was measured at least 19cm in size, but extending beyond the pelvic field of view. The cyst is largely multi-cystic. Tumor markers were obtained which showed  42 and CEA <0.5.     Since delivery she had one episode of severe pain which subsided without intervention. Otherwise she has the expected lochia. No other symptoms. She has completed childbearing, however, her insurance requires her to have signed consent 30 days prior to any sterilization procedure       Review of Systems:  As per HPI, otherwise non-contributory.     OB/Gynecologic History:  ,  x 3  Currently pregnant at 38w3d. No complications.   Last Pap Smear: 2018 negative/co-testing not done. History of abnormal: LSIL/HPV 18+ in , no history of high grade    Past Medical History:   Diagnosis Date     Closed fracture of metatarsal of right foot     V MT     H/O colposcopy with cervical biopsy 3/2015    Negative     History of chlamydia 2011, 2011    repeat infections     LSIL (low grade squamous intraepithelial lesion) on Pap smear 2/4/15    + HR HPV 18       Past Surgical History:   Procedure Laterality Date     NO HISTORY OF SURGERY          Social History     Tobacco Use     Smoking status: Never Smoker     Smokeless tobacco: Never Used   Substance Use Topics     Alcohol use: No     Drug use: No        Family History   Problem Relation Age of Onset     Asthma Mother      Hypertension Mother       Asthma Brother      Breast Cancer Paternal Grandmother      Diabetes No family hx of      Cancer - colorectal No family hx of        Current Outpatient Medications   Medication Sig Dispense Refill     acetaminophen (TYLENOL) 500 MG tablet Take 1-2 tablets (500-1,000 mg) by mouth every 6 hours as needed for mild pain or fever 90 tablet 1     loratadine (CLARITIN) 10 MG tablet Take 1 tablet (10 mg) by mouth daily 90 tablet 3     Prenatal Vit-Fe Fumarate-FA (PRENATAL VITAMIN) 27-0.8 MG TABS Take 1 tablet by mouth daily 90 tablet 3        No Known Allergies      Physical Exam:   LMP 12/18/2020 (Approximate)   General appearance: Alert and oriented, no acute distress, well groomed  Abd: soft, post-partum with skin laxity, in the lower abdomen there is a palpable mobile mass. Non-tender, nondistended    Labs/Pathology:   Results for FROYLAN OBRIEN (MRN [id number]) as of 9/13/2021 08:01   Ref. Range 9/3/2021 16:11 9/3/2021 16:17    Latest Ref Range: 0 - 30 U/mL 42 (H)    Inhibin B Latest Ref Range: 8 - 223 pg/mL  12   Lactate Dehydrogenase Latest Ref Range: 81 - 234 U/L  129   CEA Latest Ref Range: 0.0 - 2.5 ug/L <0.5        Imaging:    Results for orders placed in visit on 09/03/21    MR Pelvis (GYN) wo Contrast    Narrative  MR PELVIS (GYN) WITHOUT CONTRAST September 3, 2021 8:42 AM    INDICATION: Adnexal mass suspected. Complex cyst of right ovary.    COMPARISON: 7/23/2021, 6/8/2021.    TECHNIQUE: Routine MRI female pelvis protocol including T1 in/out  phase, diffusion, thin section high resolution multiplane T2, and post  contrast T1.  CONTRAST: <85429:Contrast>    FINDINGS:    UTERUS: Single intrauterine pregnancy noted, not completely included  on the study.    ADNEXA: At the margin of the study, there is a large, complex, cystic  mass presumed to be arising from the right ovary that measures at  least 19.1 x 7.0 cm in the axial plane (series 3, image 2). This  lesion is incompletely included on the study.  Left ovary not  definitely visualized.    ADDITIONAL FINDINGS: No ascites or lymphadenopathy. No peritoneal  nodules are demonstrated.    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 This mass is  incompletely included on the study due to gravid uterus.    PREMA SWANSON MD      SYSTEM ID:  [id number]    Assessment:  Shanna Smith is a 31 year old  s/p  on 2021 with complex adnexal cyst.    Plan:   1. Ovarian cyst: See prior note for full discussion/counseling. We will plan for laparoscopic USO with frozen section. She is consented for possible staging and hysterectomy if indicated based upon intra-operative findings. She has signed sterilization form, but she did not sign this 30 days prior to surgery, so we will not be able to perform bilateral salpingectomy at the time of her procedure. We will remove tube on the affected side.     I spent a total of 25 minutes with Shanna Smith on the day of service. This included 10 min in pre-op exam and discussion. The remaining 15 minutes were discussion of plan of care.     Ramirez Castillo MD    Division of Gynecologic Oncology  Department of Ob/Gyn and Women's Health  [facility]

## 2021-10-12 NOTE — NURSING NOTE
"Oncology Rooming Note    October 12, 2021 10:11 AM   Shanna Smith is a 31 year old female who presents for:    Chief Complaint   Patient presents with     Oncology Clinic Visit     4 week follow up- Pre-surgical consult     Initial Vitals: /78 (BP Location: Right arm)   Pulse 67   Temp 98.2  F (36.8  C) (Oral)   Resp 16   Ht 1.753 m (5' 9\")   Wt 100.5 kg (221 lb 9.6 oz)   LMP 12/18/2020 (Approximate)   SpO2 95%   Breastfeeding Yes   BMI 32.72 kg/m   Estimated body mass index is 32.72 kg/m  as calculated from the following:    Height as of this encounter: 1.753 m (5' 9\").    Weight as of this encounter: 100.5 kg (221 lb 9.6 oz). Body surface area is 2.21 meters squared.  No Pain (0) Comment: Data Unavailable   Patient's last menstrual period was 12/18/2020 (approximate).  Allergies reviewed: Yes  Medications reviewed: Yes    Medications: Medication refills not needed today.  Pharmacy name entered into [facility]: [facility] [place] [address] [place] [address] [place].    Clinical concerns: Discuss surgery       Shanta Gracia LPN              "

## 2021-10-12 NOTE — H&P (VIEW-ONLY)
Gynecologic Oncology Clinic    Patient: Shanna Smith  : 1990    Date of Visit: Oct 12, 2021     Reason for visit: ovarian cyst in pregnancy    History of Present Illness:  Shanna Smith is a 31 year old  s/p vaginal delivery 2021 who returns to discuss surgery for ovarian cyst noted during pregnancy.     Cyst was first noted on 20wk ultrasound as a complex cyst from the right ovary which at that time measured about 7.5cm in size. It enlarged on subsequent imaging and ultimately she had a pelvic MRI where it was measured at least 19cm in size, but extending beyond the pelvic field of view. The cyst is largely multi-cystic. Tumor markers were obtained which showed  42 and CEA <0.5.     Since delivery she had one episode of severe pain which subsided without intervention. Otherwise she has the expected lochia. No other symptoms. She has completed childbearing, however, her insurance requires her to have signed consent 30 days prior to any sterilization procedure       Review of Systems:  As per HPI, otherwise non-contributory.     OB/Gynecologic History:  ,  x 3  Currently pregnant at 38w3d. No complications.   Last Pap Smear: 2018 negative/co-testing not done. History of abnormal: LSIL/HPV 18+ in , no history of high grade    Past Medical History:   Diagnosis Date     Closed fracture of metatarsal of right foot     V MT     H/O colposcopy with cervical biopsy 3/2015    Negative     History of chlamydia 2011, 2011    repeat infections     LSIL (low grade squamous intraepithelial lesion) on Pap smear 2/4/15    + HR HPV 18       Past Surgical History:   Procedure Laterality Date     NO HISTORY OF SURGERY          Social History     Tobacco Use     Smoking status: Never Smoker     Smokeless tobacco: Never Used   Substance Use Topics     Alcohol use: No     Drug use: No        Family History   Problem Relation Age of Onset     Asthma Mother      Hypertension Mother       Asthma Brother      Breast Cancer Paternal Grandmother      Diabetes No family hx of      Cancer - colorectal No family hx of        Current Outpatient Medications   Medication Sig Dispense Refill     acetaminophen (TYLENOL) 500 MG tablet Take 1-2 tablets (500-1,000 mg) by mouth every 6 hours as needed for mild pain or fever 90 tablet 1     loratadine (CLARITIN) 10 MG tablet Take 1 tablet (10 mg) by mouth daily 90 tablet 3     Prenatal Vit-Fe Fumarate-FA (PRENATAL VITAMIN) 27-0.8 MG TABS Take 1 tablet by mouth daily 90 tablet 3        No Known Allergies      Physical Exam:   LMP 12/18/2020 (Approximate)   General appearance: Alert and oriented, no acute distress, well groomed  Abd: soft, post-partum with skin laxity, in the lower abdomen there is a palpable mobile mass. Non-tender, nondistended    Labs/Pathology:   Results for FROYLAN OBRIEN (MRN [id number]) as of 9/13/2021 08:01   Ref. Range 9/3/2021 16:11 9/3/2021 16:17    Latest Ref Range: 0 - 30 U/mL 42 (H)    Inhibin B Latest Ref Range: 8 - 223 pg/mL  12   Lactate Dehydrogenase Latest Ref Range: 81 - 234 U/L  129   CEA Latest Ref Range: 0.0 - 2.5 ug/L <0.5        Imaging:    Results for orders placed in visit on 09/03/21    MR Pelvis (GYN) wo Contrast    Narrative  MR PELVIS (GYN) WITHOUT CONTRAST September 3, 2021 8:42 AM    INDICATION: Adnexal mass suspected. Complex cyst of right ovary.    COMPARISON: 7/23/2021, 6/8/2021.    TECHNIQUE: Routine MRI female pelvis protocol including T1 in/out  phase, diffusion, thin section high resolution multiplane T2, and post  contrast T1.  CONTRAST: <26340:Contrast>    FINDINGS:    UTERUS: Single intrauterine pregnancy noted, not completely included  on the study.    ADNEXA: At the margin of the study, there is a large, complex, cystic  mass presumed to be arising from the right ovary that measures at  least 19.1 x 7.0 cm in the axial plane (series 3, image 2). This  lesion is incompletely included on the study.  Left ovary not  definitely visualized.    ADDITIONAL FINDINGS: No ascites or lymphadenopathy. No peritoneal  nodules are demonstrated.    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 This mass is  incompletely included on the study due to gravid uterus.    PREMA SWANSON MD      SYSTEM ID:  [id number]    Assessment:  Shanna Smith is a 31 year old  s/p  on 2021 with complex adnexal cyst.    Plan:   1. Ovarian cyst: See prior note for full discussion/counseling. We will plan for laparoscopic USO with frozen section. She is consented for possible staging and hysterectomy if indicated based upon intra-operative findings. She has signed sterilization form, but she did not sign this 30 days prior to surgery, so we will not be able to perform bilateral salpingectomy at the time of her procedure. We will remove tube on the affected side.     I spent a total of 25 minutes with Shanna Smith on the day of service. This included 10 min in pre-op exam and discussion. The remaining 15 minutes were discussion of plan of care.     Ramirez Castillo MD    Division of Gynecologic Oncology  Department of Ob/Gyn and Women's Health  [facility]

## 2021-10-12 NOTE — LETTER
10/12/2021         RE: Shanna Smith  [address] [facility] [address]        Dear Colleague,    Thank you for referring your patient, Shanna Smith, to the [facility]. Please see a copy of my visit note below.    Gynecologic Oncology Clinic    Patient: Shanna Smith  : 1990    Date of Visit: Oct 12, 2021     Reason for visit: ovarian cyst in pregnancy    History of Present Illness:  Shanna Smith is a 31 year old  s/p vaginal delivery 2021 who returns to discuss surgery for ovarian cyst noted during pregnancy.     Cyst was first noted on 20wk ultrasound as a complex cyst from the right ovary which at that time measured about 7.5cm in size. It enlarged on subsequent imaging and ultimately she had a pelvic MRI where it was measured at least 19cm in size, but extending beyond the pelvic field of view. The cyst is largely multi-cystic. Tumor markers were obtained which showed  42 and CEA <0.5.     Since delivery she had one episode of severe pain which subsided without intervention. Otherwise she has the expected lochia. No other symptoms. She has completed childbearing, however, her insurance requires her to have signed consent 30 days prior to any sterilization procedure       Review of Systems:  As per HPI, otherwise non-contributory.     OB/Gynecologic History:  ,  x 3  Currently pregnant at 38w3d. No complications.   Last Pap Smear: 2018 negative/co-testing not done. History of abnormal: LSIL/HPV 18+ in , no history of high grade    Past Medical History:   Diagnosis Date     Closed fracture of metatarsal of right foot     V MT     H/O colposcopy with cervical biopsy 3/2015    Negative     History of chlamydia 2011, 2011    repeat infections     LSIL (low grade squamous intraepithelial lesion) on Pap smear 2/4/15    + HR HPV 18       Past Surgical History:   Procedure Laterality Date     NO HISTORY OF SURGERY           Social History     Tobacco Use     Smoking status: Never Smoker     Smokeless tobacco: Never Used   Substance Use Topics     Alcohol use: No     Drug use: No        Family History   Problem Relation Age of Onset     Asthma Mother      Hypertension Mother      Asthma Brother      Breast Cancer Paternal Grandmother      Diabetes No family hx of      Cancer - colorectal No family hx of        Current Outpatient Medications   Medication Sig Dispense Refill     acetaminophen (TYLENOL) 500 MG tablet Take 1-2 tablets (500-1,000 mg) by mouth every 6 hours as needed for mild pain or fever 90 tablet 1     loratadine (CLARITIN) 10 MG tablet Take 1 tablet (10 mg) by mouth daily 90 tablet 3     Prenatal Vit-Fe Fumarate-FA (PRENATAL VITAMIN) 27-0.8 MG TABS Take 1 tablet by mouth daily 90 tablet 3        No Known Allergies      Physical Exam:   LMP 12/18/2020 (Approximate)   General appearance: Alert and oriented, no acute distress, well groomed  Abd: soft, post-partum with skin laxity, in the lower abdomen there is a palpable mobile mass. Non-tender, nondistended    Labs/Pathology:   Results for FROYLAN OBRIEN (MRN [id number]) as of 9/13/2021 08:01   Ref. Range 9/3/2021 16:11 9/3/2021 16:17    Latest Ref Range: 0 - 30 U/mL 42 (H)    Inhibin B Latest Ref Range: 8 - 223 pg/mL  12   Lactate Dehydrogenase Latest Ref Range: 81 - 234 U/L  129   CEA Latest Ref Range: 0.0 - 2.5 ug/L <0.5        Imaging:    Results for orders placed in visit on 09/03/21    MR Pelvis (GYN) wo Contrast    Narrative  MR PELVIS (GYN) WITHOUT CONTRAST September 3, 2021 8:42 AM    INDICATION: Adnexal mass suspected. Complex cyst of right ovary.    COMPARISON: 7/23/2021, 6/8/2021.    TECHNIQUE: Routine MRI female pelvis protocol including T1 in/out  phase, diffusion, thin section high resolution multiplane T2, and post  contrast T1.  CONTRAST: <57355:Contrast>    FINDINGS:    UTERUS: Single intrauterine pregnancy noted, not completely included  on  the study.    ADNEXA: At the margin of the study, there is a large, complex, cystic  mass presumed to be arising from the right ovary that measures at  least 19.1 x 7.0 cm in the axial plane (series 3, image 2). This  lesion is incompletely included on the study. Left ovary not  definitely visualized.    ADDITIONAL FINDINGS: No ascites or lymphadenopathy. No peritoneal  nodules are demonstrated.    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 This mass is  incompletely included on the study due to gravid uterus.    PREMA SWANSON MD      SYSTEM ID:  [id number]    Assessment:  Shanna Smith is a 31 year old  s/p  on 2021 with complex adnexal cyst.    Plan:   1. Ovarian cyst: See prior note for full discussion/counseling. We will plan for laparoscopic USO with frozen section. She is consented for possible staging and hysterectomy if indicated based upon intra-operative findings. She has signed sterilization form, but she did not sign this 30 days prior to surgery, so we will not be able to perform bilateral salpingectomy at the time of her procedure. We will remove tube on the affected side.     I spent a total of 25 minutes with Shanna Smith on the day of service. This included 10 min in pre-op exam and discussion. The remaining 15 minutes were discussion of plan of care.     Ramirez Castillo MD    Division of Gynecologic Oncology  Department of Ob/Gyn and Women's Health  [facility]       Again, thank you for allowing me to participate in the care of your patient.        Sincerely,        Ramirez Castillo MD

## 2021-10-29 ENCOUNTER — PRENATAL OFFICE VISIT (OUTPATIENT)
Dept: OBGYN | Facility: CLINIC | Age: 31
End: 2021-10-29
Payer: COMMERCIAL

## 2021-10-29 VITALS
BODY MASS INDEX: 32.25 KG/M2 | OXYGEN SATURATION: 96 % | DIASTOLIC BLOOD PRESSURE: 79 MMHG | HEART RATE: 66 BPM | SYSTOLIC BLOOD PRESSURE: 122 MMHG | WEIGHT: 218.4 LBS

## 2021-10-29 DIAGNOSIS — Z12.4 PAP SMEAR FOR CERVICAL CANCER SCREENING: ICD-10-CM

## 2021-10-29 PROCEDURE — 99207 PR POST PARTUM EXAM: CPT | Performed by: OBSTETRICS & GYNECOLOGY

## 2021-10-29 PROCEDURE — 87624 HPV HI-RISK TYP POOLED RSLT: CPT | Performed by: OBSTETRICS & GYNECOLOGY

## 2021-10-29 PROCEDURE — G0145 SCR C/V CYTO,THINLAYER,RESCR: HCPCS | Performed by: OBSTETRICS & GYNECOLOGY

## 2021-10-29 ASSESSMENT — PATIENT HEALTH QUESTIONNAIRE - PHQ9: SUM OF ALL RESPONSES TO PHQ QUESTIONS 1-9: 1

## 2021-10-29 NOTE — PROGRESS NOTES
POSTPARTUM VISIT:    Delivery Information:    Date:  09/18/2021  Route:  Vaginal delivery   Sex:  Male   Weight:  3770 g     Apgars:  8/9  Reviewed pregnancy and birth.  Doing well.  No significant symptoms.  Infant doing fine.  Breast feeding:  yes      Exam:  /79 (BP Location: Right arm, Cuff Size: Adult Regular)   Pulse 66   Wt 99.1 kg (218 lb 6.4 oz)   LMP 12/18/2020 (Approximate)   SpO2 96%   BMI 32.25 kg/m    PHQ-9 = 1  General:  WNWD female, NAD  Alert  Oriented x 3  Gait:  Normal  Skin:  Normal skin turgor  HEENT:  NC/AT, EOMI  Abdomen:  Non-tender, non-distended.  Vulva: No external lesions, normal hair distribution, no adenopathy  BUS:  Normal, no masses noted  Vagina: Moist, pink, no abnormal discharge, well rugated, no lesions  Cervix: Smooth, pink, no visible lesions  Uterus: Normal size, anteverted, non-tender, mobile  Ovaries: No mass, non-tender, mobile    Reviewed contraception plans.  We reviewed the options available, the side effects, risks, benefits and instructions on proper use.    She has surgery for large ovarian cyst next week with Gyn/Onc.    Pap smear performed    Continue general medical care.

## 2021-11-03 ENCOUNTER — LAB (OUTPATIENT)
Dept: LAB | Facility: CLINIC | Age: 31
End: 2021-11-03
Payer: COMMERCIAL

## 2021-11-03 DIAGNOSIS — Z11.59 ENCOUNTER FOR SCREENING FOR OTHER VIRAL DISEASES: ICD-10-CM

## 2021-11-03 LAB
BKR LAB AP GYN ADEQUACY: NORMAL
BKR LAB AP GYN INTERPRETATION: NORMAL
BKR LAB AP HPV REFLEX: NORMAL
BKR LAB AP PREVIOUS ABNORMAL: NORMAL
PATH REPORT.COMMENTS IMP SPEC: NORMAL
PATH REPORT.RELEVANT HX SPEC: NORMAL

## 2021-11-03 PROCEDURE — U0005 INFEC AGEN DETEC AMPLI PROBE: HCPCS

## 2021-11-03 PROCEDURE — U0003 INFECTIOUS AGENT DETECTION BY NUCLEIC ACID (DNA OR RNA); SEVERE ACUTE RESPIRATORY SYNDROME CORONAVIRUS 2 (SARS-COV-2) (CORONAVIRUS DISEASE [COVID-19]), AMPLIFIED PROBE TECHNIQUE, MAKING USE OF HIGH THROUGHPUT TECHNOLOGIES AS DESCRIBED BY CMS-2020-01-R: HCPCS

## 2021-11-03 NOTE — PROGRESS NOTES
Gynecologic Oncology Postoperative Check Note  2021    S: Patient reports she is doing well postoperatively. Pain well controlled with oral pain medication. Ambulating without pain. Voiding spontaneously. Tolerating crackers and water without nausea or vomiting. Denies chest pain, shortness of breath, dizziness, or other concerns at this time.     O:  Vitals:    21 2045 21 2100 21 2130 21 2200   BP: (!) 153/82 (!) 156/102 (!) 147/70 (!) 140/84   BP Location:       Pulse: 78 64     Resp:   12 14   Temp:   99  F (37.2  C) 99.1  F (37.3  C)   TempSrc:   Oral Axillary   SpO2: 94% 93% 94% 95%   Weight:       Height:           Gen: In no distress  Cardio: RRR, S1/S2, no murmurs  Resp: CTAB, no wheezing or crackles  Abdomen: soft, appropriately tender, nondistended, incision intact  Extremities: Non-tender, no edema    A: 31-yo  woman who is POD#0 s/p diagnostic lap via single port with BSO, omentectomy, pelvis washings, peritoneal biopsies. She recently delivered her fourth child via  on 21. Currently, she is doing well.    Complex ovarian cyst  Dz: Cyst was initially noted at 20wk US on 21 measuring 7.5cm. Pelvic MRI 21 showed large, complex, cystic mass arising from the right ovary measuring at least 19.1 x 7.0 cm. CA-125 42, CEA <0.5. Patient desired sterility however tubal papers not signed >30 days. 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   FEN: Advance as tolerated  Pain: Tylenol, oxycodone PRN, s/p TAP block   GI: antiemetics and bowel regimen.  : Voiding spontaneously    Dispo: To home    Ramirez Fishman MD  Obstetrics, Gynecology, and Women's Health  PGY2  10:24 PM 2021

## 2021-11-04 ENCOUNTER — ANESTHESIA EVENT (OUTPATIENT)
Dept: SURGERY | Facility: CLINIC | Age: 31
End: 2021-11-04
Payer: COMMERCIAL

## 2021-11-04 LAB — SARS-COV-2 RNA RESP QL NAA+PROBE: NEGATIVE

## 2021-11-05 ENCOUNTER — HOSPITAL ENCOUNTER (OUTPATIENT)
Facility: CLINIC | Age: 31
Discharge: HOME | End: 2021-11-05
Attending: OBSTETRICS & GYNECOLOGY | Admitting: OBSTETRICS & GYNECOLOGY
Payer: COMMERCIAL

## 2021-11-05 ENCOUNTER — ANESTHESIA (OUTPATIENT)
Dept: SURGERY | Facility: CLINIC | Age: 31
End: 2021-11-05
Payer: COMMERCIAL

## 2021-11-05 VITALS
HEIGHT: 69 IN | HEART RATE: 64 BPM | RESPIRATION RATE: 14 BRPM | SYSTOLIC BLOOD PRESSURE: 140 MMHG | TEMPERATURE: 99.1 F | OXYGEN SATURATION: 95 % | BODY MASS INDEX: 32.56 KG/M2 | WEIGHT: 219.8 LBS | DIASTOLIC BLOOD PRESSURE: 84 MMHG

## 2021-11-05 DIAGNOSIS — Z90.710 STATUS POST HYSTERECTOMY: Primary | ICD-10-CM

## 2021-11-05 DIAGNOSIS — J20.9 ACUTE BRONCHITIS, UNSPECIFIED ORGANISM: ICD-10-CM

## 2021-11-05 DIAGNOSIS — N83.299 COMPLEX OVARIAN CYST: ICD-10-CM

## 2021-11-05 DIAGNOSIS — D49.59: ICD-10-CM

## 2021-11-05 DIAGNOSIS — N83.299 COMPLEX OVARIAN CYST: Primary | ICD-10-CM

## 2021-11-05 DIAGNOSIS — O34.83: ICD-10-CM

## 2021-11-05 LAB
ABO/RH(D): NORMAL
AFP SERPL-MCNC: 2.1 UG/L (ref 0–8)
ANTIBODY SCREEN: NEGATIVE
CANCER AG125 SERPL-ACNC: 26 U/ML (ref 0–30)
CREAT SERPL-MCNC: 0.62 MG/DL (ref 0.52–1.04)
GFR SERPL CREATININE-BSD FRML MDRD: >90 ML/MIN/1.73M2
GLUCOSE BLDC GLUCOMTR-MCNC: 77 MG/DL (ref 70–99)
HCG SERPL QL: NEGATIVE
HGB BLD-MCNC: 12.4 G/DL (ref 11.7–15.7)
HUMAN PAPILLOMA VIRUS 16 DNA: NEGATIVE
HUMAN PAPILLOMA VIRUS 18 DNA: NEGATIVE
HUMAN PAPILLOMA VIRUS FINAL DIAGNOSIS: NORMAL
HUMAN PAPILLOMA VIRUS OTHER HR: NEGATIVE
PLATELET # BLD AUTO: 249 10E3/UL (ref 150–450)
POTASSIUM BLD-SCNC: 3.7 MMOL/L (ref 3.4–5.3)
SPECIMEN EXPIRATION DATE: NORMAL

## 2021-11-05 PROCEDURE — 250N000011 HC RX IP 250 OP 636: Performed by: NURSE ANESTHETIST, CERTIFIED REGISTERED

## 2021-11-05 PROCEDURE — 250N000011 HC RX IP 250 OP 636: Performed by: ANESTHESIOLOGY

## 2021-11-05 PROCEDURE — 82962 GLUCOSE BLOOD TEST: CPT

## 2021-11-05 PROCEDURE — 85049 AUTOMATED PLATELET COUNT: CPT | Performed by: ANESTHESIOLOGY

## 2021-11-05 PROCEDURE — 88331 PATH CONSLTJ SURG 1 BLK 1SPC: CPT | Mod: 26 | Performed by: PATHOLOGY

## 2021-11-05 PROCEDURE — 86900 BLOOD TYPING SEROLOGIC ABO: CPT | Performed by: ANESTHESIOLOGY

## 2021-11-05 PROCEDURE — 710N000012 HC RECOVERY PHASE 2, PER MINUTE: Performed by: OBSTETRICS & GYNECOLOGY

## 2021-11-05 PROCEDURE — 88305 TISSUE EXAM BY PATHOLOGIST: CPT | Mod: 26 | Performed by: PATHOLOGY

## 2021-11-05 PROCEDURE — 82105 ALPHA-FETOPROTEIN SERUM: CPT | Performed by: OBSTETRICS & GYNECOLOGY

## 2021-11-05 PROCEDURE — 86304 IMMUNOASSAY TUMOR CA 125: CPT | Performed by: OBSTETRICS & GYNECOLOGY

## 2021-11-05 PROCEDURE — 250N000013 HC RX MED GY IP 250 OP 250 PS 637: Performed by: STUDENT IN AN ORGANIZED HEALTH CARE EDUCATION/TRAINING PROGRAM

## 2021-11-05 PROCEDURE — C9290 INJ, BUPIVACAINE LIPOSOME: HCPCS | Performed by: ANESTHESIOLOGY

## 2021-11-05 PROCEDURE — 360N000077 HC SURGERY LEVEL 4, PER MIN: Performed by: OBSTETRICS & GYNECOLOGY

## 2021-11-05 PROCEDURE — 710N000010 HC RECOVERY PHASE 1, LEVEL 2, PER MIN: Performed by: OBSTETRICS & GYNECOLOGY

## 2021-11-05 PROCEDURE — 88112 CYTOPATH CELL ENHANCE TECH: CPT | Mod: 26 | Performed by: PATHOLOGY

## 2021-11-05 PROCEDURE — 250N000013 HC RX MED GY IP 250 OP 250 PS 637: Performed by: ANESTHESIOLOGY

## 2021-11-05 PROCEDURE — 85018 HEMOGLOBIN: CPT | Performed by: ANESTHESIOLOGY

## 2021-11-05 PROCEDURE — 250N000009 HC RX 250: Performed by: NURSE ANESTHETIST, CERTIFIED REGISTERED

## 2021-11-05 PROCEDURE — 84703 CHORIONIC GONADOTROPIN ASSAY: CPT | Performed by: ANESTHESIOLOGY

## 2021-11-05 PROCEDURE — 88309 TISSUE EXAM BY PATHOLOGIST: CPT | Mod: 26 | Performed by: PATHOLOGY

## 2021-11-05 PROCEDURE — 88331 PATH CONSLTJ SURG 1 BLK 1SPC: CPT | Mod: TC | Performed by: OBSTETRICS & GYNECOLOGY

## 2021-11-05 PROCEDURE — 88307 TISSUE EXAM BY PATHOLOGIST: CPT | Mod: 26 | Performed by: PATHOLOGY

## 2021-11-05 PROCEDURE — 88112 CYTOPATH CELL ENHANCE TECH: CPT | Mod: TC | Performed by: OBSTETRICS & GYNECOLOGY

## 2021-11-05 PROCEDURE — 84132 ASSAY OF SERUM POTASSIUM: CPT | Performed by: ANESTHESIOLOGY

## 2021-11-05 PROCEDURE — 82565 ASSAY OF CREATININE: CPT | Performed by: ANESTHESIOLOGY

## 2021-11-05 PROCEDURE — 370N000017 HC ANESTHESIA TECHNICAL FEE, PER MIN: Performed by: OBSTETRICS & GYNECOLOGY

## 2021-11-05 PROCEDURE — 36415 COLL VENOUS BLD VENIPUNCTURE: CPT | Performed by: ANESTHESIOLOGY

## 2021-11-05 PROCEDURE — 258N000003 HC RX IP 258 OP 636: Performed by: NURSE ANESTHETIST, CERTIFIED REGISTERED

## 2021-11-05 PROCEDURE — 999N000141 HC STATISTIC PRE-PROCEDURE NURSING ASSESSMENT: Performed by: OBSTETRICS & GYNECOLOGY

## 2021-11-05 PROCEDURE — 250N000011 HC RX IP 250 OP 636: Performed by: OBSTETRICS & GYNECOLOGY

## 2021-11-05 PROCEDURE — 272N000001 HC OR GENERAL SUPPLY STERILE: Performed by: OBSTETRICS & GYNECOLOGY

## 2021-11-05 RX ORDER — AMOXICILLIN 250 MG
1-2 CAPSULE ORAL 2 TIMES DAILY
Qty: 30 TABLET | Refills: 0 | Status: SHIPPED | OUTPATIENT
Start: 2021-11-05

## 2021-11-05 RX ORDER — ACETAMINOPHEN 325 MG/1
975 TABLET ORAL EVERY 6 HOURS PRN
Qty: 50 TABLET | Refills: 0 | Status: SHIPPED | OUTPATIENT
Start: 2021-11-05

## 2021-11-05 RX ORDER — OXYCODONE HYDROCHLORIDE 5 MG/1
5 TABLET ORAL EVERY 6 HOURS PRN
Qty: 10 TABLET | Refills: 0 | Status: SHIPPED | OUTPATIENT
Start: 2021-11-05 | End: 2021-11-05

## 2021-11-05 RX ORDER — PROPOFOL 10 MG/ML
INJECTION, EMULSION INTRAVENOUS CONTINUOUS PRN
Status: DISCONTINUED | OUTPATIENT
Start: 2021-11-05 | End: 2021-11-05

## 2021-11-05 RX ORDER — ACETAMINOPHEN 500 MG
500 TABLET ORAL EVERY 6 HOURS PRN
Qty: 30 TABLET | Refills: 1 | Status: SHIPPED | OUTPATIENT
Start: 2021-11-05

## 2021-11-05 RX ORDER — CEFAZOLIN SODIUM 2 G/100ML
INJECTION, SOLUTION INTRAVENOUS PRN
Status: DISCONTINUED | OUTPATIENT
Start: 2021-11-05 | End: 2021-11-05

## 2021-11-05 RX ORDER — ONDANSETRON 2 MG/ML
INJECTION INTRAMUSCULAR; INTRAVENOUS PRN
Status: DISCONTINUED | OUTPATIENT
Start: 2021-11-05 | End: 2021-11-05

## 2021-11-05 RX ORDER — HALOPERIDOL 5 MG/ML
1 INJECTION INTRAMUSCULAR
Status: DISCONTINUED | OUTPATIENT
Start: 2021-11-05 | End: 2021-11-06 | Stop reason: HOSPADM

## 2021-11-05 RX ORDER — IBUPROFEN 800 MG/1
800 TABLET, FILM COATED ORAL ONCE
Status: COMPLETED | OUTPATIENT
Start: 2021-11-05 | End: 2021-11-05

## 2021-11-05 RX ORDER — SODIUM CHLORIDE, SODIUM LACTATE, POTASSIUM CHLORIDE, CALCIUM CHLORIDE 600; 310; 30; 20 MG/100ML; MG/100ML; MG/100ML; MG/100ML
INJECTION, SOLUTION INTRAVENOUS CONTINUOUS PRN
Status: DISCONTINUED | OUTPATIENT
Start: 2021-11-05 | End: 2021-11-05

## 2021-11-05 RX ORDER — ACETAMINOPHEN 325 MG/1
975 TABLET ORAL ONCE
Status: COMPLETED | OUTPATIENT
Start: 2021-11-05 | End: 2021-11-05

## 2021-11-05 RX ORDER — FENTANYL CITRATE 50 UG/ML
50 INJECTION, SOLUTION INTRAMUSCULAR; INTRAVENOUS EVERY 5 MIN PRN
Status: DISCONTINUED | OUTPATIENT
Start: 2021-11-05 | End: 2021-11-05 | Stop reason: HOSPADM

## 2021-11-05 RX ORDER — LIDOCAINE 40 MG/G
CREAM TOPICAL
Status: DISCONTINUED | OUTPATIENT
Start: 2021-11-05 | End: 2021-11-05 | Stop reason: HOSPADM

## 2021-11-05 RX ORDER — PROPOFOL 10 MG/ML
INJECTION, EMULSION INTRAVENOUS PRN
Status: DISCONTINUED | OUTPATIENT
Start: 2021-11-05 | End: 2021-11-05

## 2021-11-05 RX ORDER — NALOXONE HYDROCHLORIDE 0.4 MG/ML
0.4 INJECTION, SOLUTION INTRAMUSCULAR; INTRAVENOUS; SUBCUTANEOUS
Status: DISCONTINUED | OUTPATIENT
Start: 2021-11-05 | End: 2021-11-05 | Stop reason: HOSPADM

## 2021-11-05 RX ORDER — ONDANSETRON 4 MG/1
4 TABLET, ORALLY DISINTEGRATING ORAL EVERY 30 MIN PRN
Status: DISCONTINUED | OUTPATIENT
Start: 2021-11-05 | End: 2021-11-06 | Stop reason: HOSPADM

## 2021-11-05 RX ORDER — HEPARIN SODIUM 5000 [USP'U]/.5ML
5000 INJECTION, SOLUTION INTRAVENOUS; SUBCUTANEOUS
Status: COMPLETED | OUTPATIENT
Start: 2021-11-05 | End: 2021-11-05

## 2021-11-05 RX ORDER — OXYCODONE HYDROCHLORIDE 5 MG/1
5 TABLET ORAL EVERY 4 HOURS PRN
Status: DISCONTINUED | OUTPATIENT
Start: 2021-11-05 | End: 2021-11-06 | Stop reason: HOSPADM

## 2021-11-05 RX ORDER — ACETAMINOPHEN 325 MG/1
975 TABLET ORAL ONCE
Status: DISCONTINUED | OUTPATIENT
Start: 2021-11-05 | End: 2021-11-06 | Stop reason: HOSPADM

## 2021-11-05 RX ORDER — BUPIVACAINE HYDROCHLORIDE 2.5 MG/ML
INJECTION, SOLUTION EPIDURAL; INFILTRATION; INTRACAUDAL
Status: COMPLETED | OUTPATIENT
Start: 2021-11-05 | End: 2021-11-05

## 2021-11-05 RX ORDER — FENTANYL CITRATE 50 UG/ML
25-50 INJECTION, SOLUTION INTRAMUSCULAR; INTRAVENOUS
Status: DISCONTINUED | OUTPATIENT
Start: 2021-11-05 | End: 2021-11-05 | Stop reason: HOSPADM

## 2021-11-05 RX ORDER — FLUMAZENIL 0.1 MG/ML
0.2 INJECTION, SOLUTION INTRAVENOUS
Status: DISCONTINUED | OUTPATIENT
Start: 2021-11-05 | End: 2021-11-05 | Stop reason: HOSPADM

## 2021-11-05 RX ORDER — OXYCODONE HYDROCHLORIDE 5 MG/1
5 TABLET ORAL
Qty: 10 TABLET | Refills: 0 | Status: SHIPPED | OUTPATIENT
Start: 2021-11-05

## 2021-11-05 RX ORDER — OXYCODONE HYDROCHLORIDE 5 MG/1
5 TABLET ORAL
Status: COMPLETED | OUTPATIENT
Start: 2021-11-05 | End: 2021-11-05

## 2021-11-05 RX ORDER — AMOXICILLIN 250 MG
1-2 CAPSULE ORAL 2 TIMES DAILY
Qty: 30 TABLET | Refills: 0 | Status: SHIPPED | OUTPATIENT
Start: 2021-11-05 | End: 2021-11-05

## 2021-11-05 RX ORDER — LABETALOL HYDROCHLORIDE 5 MG/ML
10 INJECTION, SOLUTION INTRAVENOUS
Status: DISCONTINUED | OUTPATIENT
Start: 2021-11-05 | End: 2021-11-05 | Stop reason: HOSPADM

## 2021-11-05 RX ORDER — LIDOCAINE HYDROCHLORIDE 20 MG/ML
INJECTION, SOLUTION INFILTRATION; PERINEURAL PRN
Status: DISCONTINUED | OUTPATIENT
Start: 2021-11-05 | End: 2021-11-05

## 2021-11-05 RX ORDER — IBUPROFEN 800 MG/1
800 TABLET, FILM COATED ORAL EVERY 6 HOURS PRN
Qty: 30 TABLET | Refills: 0 | Status: SHIPPED | OUTPATIENT
Start: 2021-11-05 | End: 2021-11-05

## 2021-11-05 RX ORDER — SODIUM CHLORIDE, SODIUM LACTATE, POTASSIUM CHLORIDE, CALCIUM CHLORIDE 600; 310; 30; 20 MG/100ML; MG/100ML; MG/100ML; MG/100ML
INJECTION, SOLUTION INTRAVENOUS CONTINUOUS
Status: DISCONTINUED | OUTPATIENT
Start: 2021-11-05 | End: 2021-11-05 | Stop reason: HOSPADM

## 2021-11-05 RX ORDER — OXYCODONE HYDROCHLORIDE 5 MG/1
5 TABLET ORAL EVERY 6 HOURS PRN
Qty: 10 TABLET | Refills: 0 | Status: SHIPPED | OUTPATIENT
Start: 2021-11-05

## 2021-11-05 RX ORDER — IBUPROFEN 800 MG/1
800 TABLET, FILM COATED ORAL EVERY 6 HOURS PRN
Qty: 30 TABLET | Refills: 0 | Status: SHIPPED | OUTPATIENT
Start: 2021-11-05

## 2021-11-05 RX ORDER — MEPERIDINE HYDROCHLORIDE 25 MG/ML
12.5 INJECTION INTRAMUSCULAR; INTRAVENOUS; SUBCUTANEOUS
Status: DISCONTINUED | OUTPATIENT
Start: 2021-11-05 | End: 2021-11-06 | Stop reason: HOSPADM

## 2021-11-05 RX ORDER — NALOXONE HYDROCHLORIDE 0.4 MG/ML
0.2 INJECTION, SOLUTION INTRAMUSCULAR; INTRAVENOUS; SUBCUTANEOUS
Status: DISCONTINUED | OUTPATIENT
Start: 2021-11-05 | End: 2021-11-05 | Stop reason: HOSPADM

## 2021-11-05 RX ORDER — FENTANYL CITRATE 50 UG/ML
50 INJECTION, SOLUTION INTRAMUSCULAR; INTRAVENOUS
Status: DISCONTINUED | OUTPATIENT
Start: 2021-11-05 | End: 2021-11-06 | Stop reason: HOSPADM

## 2021-11-05 RX ORDER — OXYCODONE HYDROCHLORIDE 5 MG/1
5 TABLET ORAL EVERY 6 HOURS PRN
Qty: 6 TABLET | Refills: 0 | Status: SHIPPED | OUTPATIENT
Start: 2021-11-05 | End: 2021-11-08

## 2021-11-05 RX ORDER — SODIUM CHLORIDE, SODIUM LACTATE, POTASSIUM CHLORIDE, CALCIUM CHLORIDE 600; 310; 30; 20 MG/100ML; MG/100ML; MG/100ML; MG/100ML
INJECTION, SOLUTION INTRAVENOUS CONTINUOUS
Status: DISCONTINUED | OUTPATIENT
Start: 2021-11-05 | End: 2021-11-06 | Stop reason: HOSPADM

## 2021-11-05 RX ORDER — DEXAMETHASONE SODIUM PHOSPHATE 4 MG/ML
INJECTION, SOLUTION INTRA-ARTICULAR; INTRALESIONAL; INTRAMUSCULAR; INTRAVENOUS; SOFT TISSUE PRN
Status: DISCONTINUED | OUTPATIENT
Start: 2021-11-05 | End: 2021-11-05

## 2021-11-05 RX ORDER — ONDANSETRON 2 MG/ML
4 INJECTION INTRAMUSCULAR; INTRAVENOUS EVERY 30 MIN PRN
Status: DISCONTINUED | OUTPATIENT
Start: 2021-11-05 | End: 2021-11-06 | Stop reason: HOSPADM

## 2021-11-05 RX ORDER — HYDROMORPHONE HYDROCHLORIDE 1 MG/ML
0.4 INJECTION, SOLUTION INTRAMUSCULAR; INTRAVENOUS; SUBCUTANEOUS EVERY 5 MIN PRN
Status: DISCONTINUED | OUTPATIENT
Start: 2021-11-05 | End: 2021-11-05 | Stop reason: HOSPADM

## 2021-11-05 RX ORDER — FENTANYL CITRATE 50 UG/ML
INJECTION, SOLUTION INTRAMUSCULAR; INTRAVENOUS PRN
Status: DISCONTINUED | OUTPATIENT
Start: 2021-11-05 | End: 2021-11-05

## 2021-11-05 RX ADMIN — ROCURONIUM BROMIDE 50 MG: 50 INJECTION, SOLUTION INTRAVENOUS at 15:05

## 2021-11-05 RX ADMIN — HEPARIN SODIUM 5000 UNITS: 10000 INJECTION, SOLUTION INTRAVENOUS; SUBCUTANEOUS at 14:53

## 2021-11-05 RX ADMIN — LIDOCAINE HYDROCHLORIDE 100 MG: 20 INJECTION, SOLUTION INFILTRATION; PERINEURAL at 15:05

## 2021-11-05 RX ADMIN — SUGAMMADEX 200 MG: 100 INJECTION, SOLUTION INTRAVENOUS at 18:58

## 2021-11-05 RX ADMIN — FENTANYL CITRATE 50 MCG: 50 INJECTION, SOLUTION INTRAMUSCULAR; INTRAVENOUS at 17:58

## 2021-11-05 RX ADMIN — PROPOFOL 200 MCG/KG/MIN: 10 INJECTION, EMULSION INTRAVENOUS at 15:54

## 2021-11-05 RX ADMIN — ROCURONIUM BROMIDE 20 MG: 50 INJECTION, SOLUTION INTRAVENOUS at 15:52

## 2021-11-05 RX ADMIN — DEXAMETHASONE SODIUM PHOSPHATE 8 MG: 4 INJECTION, SOLUTION INTRA-ARTICULAR; INTRALESIONAL; INTRAMUSCULAR; INTRAVENOUS; SOFT TISSUE at 15:05

## 2021-11-05 RX ADMIN — ROCURONIUM BROMIDE 20 MG: 50 INJECTION, SOLUTION INTRAVENOUS at 17:57

## 2021-11-05 RX ADMIN — SODIUM CHLORIDE, POTASSIUM CHLORIDE, SODIUM LACTATE AND CALCIUM CHLORIDE: 600; 310; 30; 20 INJECTION, SOLUTION INTRAVENOUS at 14:59

## 2021-11-05 RX ADMIN — PROPOFOL 200 MG: 10 INJECTION, EMULSION INTRAVENOUS at 15:05

## 2021-11-05 RX ADMIN — OXYCODONE HYDROCHLORIDE 5 MG: 5 TABLET ORAL at 20:58

## 2021-11-05 RX ADMIN — BUPIVACAINE HYDROCHLORIDE 20 ML: 2.5 INJECTION, SOLUTION EPIDURAL; INFILTRATION; INTRACAUDAL; PERINEURAL at 14:50

## 2021-11-05 RX ADMIN — FENTANYL CITRATE 50 MCG: 50 INJECTION, SOLUTION INTRAMUSCULAR; INTRAVENOUS at 14:43

## 2021-11-05 RX ADMIN — SODIUM CHLORIDE, POTASSIUM CHLORIDE, SODIUM LACTATE AND CALCIUM CHLORIDE: 600; 310; 30; 20 INJECTION, SOLUTION INTRAVENOUS at 18:50

## 2021-11-05 RX ADMIN — ONDANSETRON 4 MG: 2 INJECTION INTRAMUSCULAR; INTRAVENOUS at 16:30

## 2021-11-05 RX ADMIN — MIDAZOLAM 1 MG: 1 INJECTION INTRAMUSCULAR; INTRAVENOUS at 14:42

## 2021-11-05 RX ADMIN — ONDANSETRON 4 MG: 2 INJECTION INTRAMUSCULAR; INTRAVENOUS at 19:34

## 2021-11-05 RX ADMIN — FENTANYL CITRATE 50 MCG: 50 INJECTION, SOLUTION INTRAMUSCULAR; INTRAVENOUS at 15:34

## 2021-11-05 RX ADMIN — ACETAMINOPHEN 975 MG: 325 TABLET, FILM COATED ORAL at 20:30

## 2021-11-05 RX ADMIN — PROPOFOL 150 MCG/KG/MIN: 10 INJECTION, EMULSION INTRAVENOUS at 15:09

## 2021-11-05 RX ADMIN — ACETAMINOPHEN 975 MG: 500 TABLET, FILM COATED ORAL at 13:43

## 2021-11-05 RX ADMIN — FENTANYL CITRATE 50 MCG: 50 INJECTION, SOLUTION INTRAMUSCULAR; INTRAVENOUS at 15:16

## 2021-11-05 RX ADMIN — FENTANYL CITRATE 50 MCG: 50 INJECTION, SOLUTION INTRAMUSCULAR; INTRAVENOUS at 15:37

## 2021-11-05 RX ADMIN — FENTANYL CITRATE 50 MCG: 50 INJECTION, SOLUTION INTRAMUSCULAR; INTRAVENOUS at 18:42

## 2021-11-05 RX ADMIN — FENTANYL CITRATE 50 MCG: 50 INJECTION, SOLUTION INTRAMUSCULAR; INTRAVENOUS at 16:02

## 2021-11-05 RX ADMIN — ROCURONIUM BROMIDE 30 MG: 50 INJECTION, SOLUTION INTRAVENOUS at 17:18

## 2021-11-05 RX ADMIN — FENTANYL CITRATE 25 MCG: 50 INJECTION INTRAMUSCULAR; INTRAVENOUS at 20:45

## 2021-11-05 RX ADMIN — BUPIVACAINE 20 ML: 13.3 INJECTION, SUSPENSION, LIPOSOMAL INFILTRATION at 14:50

## 2021-11-05 RX ADMIN — CEFAZOLIN SODIUM 2 G: 2 INJECTION, SOLUTION INTRAVENOUS at 17:20

## 2021-11-05 RX ADMIN — IBUPROFEN 800 MG: 200 TABLET, FILM COATED ORAL at 20:55

## 2021-11-05 ASSESSMENT — MIFFLIN-ST. JEOR: SCORE: 1776.38

## 2021-11-05 NOTE — ANESTHESIA PROCEDURE NOTES
Airway       Patient location during procedure: OR       Procedure Start/Stop Times: 11/5/2021 3:09 PM  Staff -        Other Anesthesia Staff: Manas Nieves       Performed By: SRNA  Consent for Airway        Urgency: elective  Indications and Patient Condition       Indications for airway management: corrie-procedural       Induction type:intravenous       Mask difficulty assessment: 1 - vent by mask    Final Airway Details       Final airway type: endotracheal airway       Successful airway: ETT - single  Endotracheal Airway Details        ETT size (mm): 7.0       Cuffed: yes       Successful intubation technique: direct laryngoscopy       DL Blade Type: Coyle 2       Grade View of Cords: 1       Adjucts: stylet       Position: Right       Measured from: gums/teeth       Secured at (cm): 21       Bite block used: None    Post intubation assessment        Placement verified by: capnometry, equal breath sounds and chest rise        Number of attempts at approach: 1       Secured with: pink tape       Ease of procedure: easy       Dentition: Intact and Unchanged

## 2021-11-05 NOTE — ANESTHESIA PROCEDURE NOTES
TAP Procedure Note    Pre-Procedure   Staff -        Anesthesiologist:  Best Larsen MD       Resident/Fellow: Francine Velez MD       Performed By: resident       Location: pre-op       Procedure Start/Stop Times: 11/5/2021 2:40 PM and 11/5/2021 2:50 PM       Pre-Anesthestic Checklist: patient identified, IV checked, site marked, risks and benefits discussed, informed consent, monitors and equipment checked, pre-op evaluation, at physician/surgeon's request and post-op pain management  Timeout:       Correct Patient: Yes        Correct Procedure: Yes        Correct Site: Yes        Correct Position: Yes        Correct Laterality: Yes        Site Marked: Yes  Procedure Documentation  Procedure: TAP       Diagnosis: POST OPERATIVE PAIN       Laterality: bilateral       Patient Position: supine       Patient Prep/Sterile Barriers: sterile gloves, mask       Skin prep: Chloraprep       Needle Type: short bevel       Needle Gauge: 21.        Needle Length (millimeters): 110        Ultrasound guided       1. Ultrasound was used to identify targeted nerve, plexus, vascular marker, or fascial plane and place a needle adjacent to it in real-time.       2. Ultrasound was used to visualize the spread of anesthetic in close proximity to the above referenced structure.       3. A permanent image is entered into the patient's record.    Assessment/Narrative         The placement was negative for: blood aspirated, painful injection and site bleeding       Paresthesias: No.     Bolus given via needle..        Secured via.        Insertion/Infusion Method: Single Shot       Complications: none       Injection made incrementally with aspirations every 5 mL.    Medication(s) Administered   Bupivacaine 0.25% PF (Infiltration), 20 mL  Bupivacaine liposome (Exparel) 1.3% LA inj susp (Infiltration), 20 mL  Medication Administration Time: 11/5/2021 2:50 PM     Comments:  Bilateral Transversus Abdominis Plane  Block

## 2021-11-05 NOTE — PROGRESS NOTES
Temp:  [98.1  F (36.7  C)] 98.1  F (36.7  C)  Pulse:  [45-57] 51  Resp:  [11-16] 15  BP: (109-123)/(61-76) 118/71  Cuff Mean (mmHg):  [86-93] 90  SpO2:  [95 %-99 %] 98 %    Bilateral TAP block performed without complications, 1mg versed, 50mcg fentanyl given.  NSR, VSS, 2L O2 via NC.  Pt tolerated well.  Will continue to monitor.

## 2021-11-06 NOTE — BRIEF OP NOTE
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    Procedure: Procedure(s):  Single-incision laparoscopic BILATERAL SALPINGO-OOPHORECTOMY  Laparoscopic hysterectomy total, Omentectomy, Pelvic washings, Peritoneal biopsies   Surgeon: Surgeon(s) and Role:     * Ramirez Castillo MD - Primary     * Rafaela Lopes MD - Assisting  Anesthesia: General   Estimated Blood Loss: 50mL  Drains: None, Huynh during the case  Specimens:   ID Type Source Tests Collected by Time Destination   1 : pelvic washings Washings Pelvis NON-GYNECOLOGIC CYTOLOGY Ramirez Castillo MD 11/5/2021  4:03 PM    2 : Right Tube and Ovary  Tissue Ovary and Fallopian Tube, Right SURGICAL PATHOLOGY EXAM Ramirez Castillo MD 11/5/2021  4:08 PM    3 : Enlarged Right External iliac Lymph node Tissue Lymph Node(s) SURGICAL PATHOLOGY EXAM Ramirez Castillo MD 11/5/2021  5:43 PM    4 : UTERUS, CERVIX AND LEFT FALLOPIAN TUBE AND OVARY Tissue Uterus, Cervix SURGICAL PATHOLOGY EXAM Ramirez Castillo MD 11/5/2021  6:12 PM    5 : additional Right Gonadal vessel Tissue Other SURGICAL PATHOLOGY EXAM Ramirez Castillo MD 11/5/2021  6:15 PM    6 : Bladder peritoneal biopsy  Biopsy Other SURGICAL PATHOLOGY EXAM Ramirez Castillo MD 11/5/2021  6:29 PM    7 : Right Pelvic side wall biopsy Biopsy Other SURGICAL PATHOLOGY EXAM Ramirez Castillo MD 11/5/2021  6:30 PM    8 : Left Pelvic side wall biopsy Biopsy Other SURGICAL PATHOLOGY EXAM Ramirez Castillo MD 11/5/2021  6:30 PM    9 : Posterior culdasac biopsy Biopsy Other SURGICAL PATHOLOGY EXAM Ramirez Castillo MD 11/5/2021  6:33 PM    10 : Right Paracolic gutter biopsy Biopsy Other SURGICAL PATHOLOGY EXAM Ramirez Castillo MD 11/5/2021  6:34 PM    11 : left Paracolic gutter biopsy Biopsy Other SURGICAL PATHOLOGY EXAM Ramirez Castillo MD 11/5/2021  6:36 PM     12 : omentum Tissue Omentum SURGICAL PATHOLOGY EXAM Ramirez Castillo MD 11/5/2021  6:40 PM      Findings:   Bimanual with retroverted uterus, large mobile mass in the midline. Vagina and rectum without nodularity or masses. On laparoscopy, no evidence of injury on entry. Normal appearing liver surface, diaphragm, stomach edge. Omentum, large and small bowel unremarkable. Appendix normal in appearance. Peritoneum without implants. Enlarged right external iliac lymph node (removed) otherwise pelvic nodes and periaortic nodes palpated through single site port and not enlarged.  Smooth appearing 20 cm right ovarian mass, mucinous contents with drainage in Endocatch bag. Frozen: at least borderline mucinous. Uterus, left ovary and fallopian tubes normal in appearance. Bilateral ureters visualized vermiculating before and following procedure. Vaginal cuff intact, hemostatic at end of procedure.     Complications: None.  Implants: * No implants in log *

## 2021-11-06 NOTE — ANESTHESIA POSTPROCEDURE EVALUATION
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    Anesthesia Type:  No value filed.    Note:  Disposition: Outpatient   Postop Pain Control: Uneventful            Sign Out: Well controlled pain   PONV: No   Neuro/Psych: Uneventful            Sign Out: Acceptable/Baseline neuro status   Airway/Respiratory: Uneventful            Sign Out: Acceptable/Baseline resp. status   CV/Hemodynamics: Uneventful            Sign Out: Acceptable CV status   Other NRE: NONE   DID A NON-ROUTINE EVENT OCCUR? No           Last vitals:  Vitals Value Taken Time   /92 11/05/21 2030   Temp 37.6  C (99.7  F) 11/05/21 2000   Pulse 63 11/05/21 2035   Resp 12 11/05/21 2000   SpO2 95 % 11/05/21 2035   Vitals shown include unvalidated device data.    Electronically Signed By: Rafaela Gibson MD  November 5, 2021  8:37 PM

## 2021-11-06 NOTE — ANESTHESIA CARE TRANSFER NOTE
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    Anesthesia Type:   No value filed.     Note:    Oropharynx: oropharynx clear of all foreign objects  Level of Consciousness: awake  Oxygen Supplementation: nasal cannula  Level of Supplemental Oxygen (L/min / FiO2): 2  Independent Airway: airway patency satisfactory and stable  Dentition: dentition unchanged  Vital Signs Stable: post-procedure vital signs reviewed and stable  Report to RN Given: handoff report given  Patient transferred to: PACU    Handoff Report: Identifed the Patient, Identified the Reponsible Provider, Reviewed the pertinent medical history, Discussed the surgical course, Reviewed Intra-OP anesthesia mangement and issues during anesthesia, Set expectations for post-procedure period and Allowed opportunity for questions and acknowledgement of understanding      Vitals:  Vitals Value Taken Time   /102 11/05/21 1916   Temp     Pulse 72 11/05/21 1920   Resp     SpO2 99 % 11/05/21 1920   Vitals shown include unvalidated device data.    Electronically Signed By: PAZ Valentin CRNA  November 5, 2021  7:20 PM

## 2021-11-06 NOTE — DISCHARGE INSTRUCTIONS
Same-Day Surgery   Adult Discharge Orders & Instructions     For 24 hours after surgery:  1. Get plenty of rest.  A responsible adult must stay with you for at least 24 hours after you leave the hospital.   2. Pain medication can slow your reflexes. Do not drive or use heavy equipment.  If you have weakness or tingling, don't drive or use heavy equipment until this feeling goes away.  3. Mixing alcohol and pain medication can cause dizziness and slow your breathing. It can even be fatal. Do not drink alcohol while taking pain medication.  4. Avoid strenuous or risky activities.  Ask for help when climbing stairs.   5. You may feel lightheaded.  If so, sit for a few minutes before standing.  Have someone help you get up.   6. If you have nausea (feel sick to your stomach), drink only clear liquids such as apple juice, ginger ale, broth or 7-Up.  Rest may also help.  Be sure to drink enough fluids.  Move to a regular diet as you feel able. Take pain medications with a small amount of solid food, such as toast or crackers, to avoid nausea.   7. A slight fever is normal. Call the doctor if your fever is over 100 F (37.7 C) (taken under the tongue) or lasts longer than 24 hours.  8. You may have a dry mouth, muscle aches, trouble sleeping or a sore throat.  These symptoms should go away after 24 hours.  9. Do not make important or legal decisions.   Pain Management:      1. Take pain medication (if prescribed) for pain as directed by your physician.        2. WARNING: If the pain medication you have been prescribed contains Tylenol  (acetaminophen), DO NOT take additional doses of Tylenol (acetaminophen).     Call your doctor for any of the followin.  Signs of infection (fever, growing tenderness at the surgery site, severe pain, a large amount of drainage or bleeding, foul-smelling drainage, redness, swelling).    2.  It has been over 8 to 10 hours since surgery and you are still not able to urinate (pee).    3.   Headache for over 24 hours.    To contact a doctor, call _Dr. BARRY KhanHbyw_[phone]__ or:      [phone] and ask for the Resident On Call for:          _____GYN/ONC_____________________________________ (answered 24 hours a day)      Emergency Department:  [place] [facility]: [phone]  [place] [facility]: [phone]               Rev. 10/2014       Gynecologic Discharge Instructions - Laparoscopic surgery:  Light bleeding or spotting is normal after your procedure. You may wear a pad as needed. Do not use a tampon initially after surgery (the first 1-2 days).    For pain, we recommend that you take scheduled ibuprofen for the first 48 hours after your procedure. Your discharge instructions should indicate when you last had this medication and when you can take your next dose. You should also take scheduled acetaminophen for the first 48 hrs. If these medications are not enough to keep your pain at a level that allows you to do usual activities (within reason), you may take opioid pain medication if some was prescribed to you. If you were not prescribed opioid pain medication but your pain is not well controlled, please contact our office to discuss with a nurse.     You may walk, go up or down stairs, bend, reach above your head, or lift things weight <15lbs immediately after surgery. For heavier lifting, refer to the restrictions noted on this paperwork or as directed by your surgeon.     If your incisions have bandages over them, you may remove those 24 hours after surgery. You can then shower and let the warm water run over the incision. Do not scrub the incisions. If there are narrow strips (steri-strips) over the incisions under the bandage, leave these on for 5-7 days. After that time, you can gently pull them off. If they start to come off sooner, that is fine, just gently remove them. You do not need to put anything over your incisions, but if they have some drainage or are  "sore, you may loosely cover them with bandages.     If your incisions have glue, this will slowly wear off over the next few weeks. Do not pick this off if possible. You may shower and let the water run over the incisions. Do not scrub the incisions.    Some bruising or pale redness is normal around the incisions. If the areas become more bright red or the redness is spreading or the incisions have drainage that looks infected, please call the clinic number below. If you have fevers of 101F or higher, you should call the numbers below immediately.    If there was tissue removed at surgery, the pathology results from surgery generally take 1-2 weeks to return. These will be discussed at your post-operative appointment unless another plan has been made with your physician. Be aware that due to recent national legislation requiring immediate access to medical results, you may receive your results via [employer] before your physician has had the chance to review them. We recognize that this can create stress or anxiety and lead to many questions. Your provider will discuss your results in detail with you at your post-operative appointment (unless another plan was made for results discussion before surgery). Our clinic nurses and staff generally do not have any additional information about the results that they are able to share prior to that time.       [facility]  Nurse triage line: [phone]  Main Clinic Phone number: [phone]    For non-urgent concerns, please call during normal business hours. For urgent after hours concerns, you should call the LT[facility], located within [facility] at [phone] and ask to be connected to the Gynecology Oncology Resident Physician on call (pager [phone])    Details from Dr. Castillo:  Here are the details of what we discussed. The mass on the ovary was \"at least a mucinous borderline tumor\" these are sometimes called a mucinous tumor of low malignant " "potential. They are concerned there may be an area of mucinous adenocarcinoma (a cancer) because of the size of it and some of the abnormalities they saw, but nothing they saw on the preliminary review was abnormal enough to be called cancer, which is why they say \"at least\" a borderline tumor.    The treatment for a borderline tumor is a hysterectomy and removal of both tubes and ovaries. The treatment for a cancer is the same. The procedure you had was a total hysterectomy, removal of both tubes and ovaries, biopsies, and removal of the omentum (a fatty apron that lays in the abdomen coming from the colon - it is a place that if there is cancer the cancer can sometimes spread). If they find a cancer, all of these things that were removed will help us to determine if it has spread and to know the Stage of the cancer. That helps us to know the treatment and the prognosis. If this is a borderline tumor, the things that were removed also help us to know the risk of it coming back or help reduce the risk of it coming back.     The results from all of that won't be back for about 1.5-2 weeks. We will discuss the results at your post-operative appointment. If they come back much sooner than that, I will call you.     The ovaries being removed does put you into menopause without hormone replacement. So we will start you on hormone replacement therapy to prevent menopause at your post-operative appointment. If you have a lot of hot flashes and/or night sweats, please call the number above and we can start you on a low dose of estrogen sooner. The reason we don't start hormone replacement right away is because there's a slightly increased risk of blood clot for the first few months post-partum and estrogen can increase that risk.       "

## 2021-11-07 ENCOUNTER — DOCUMENTATION ONLY (OUTPATIENT)
Dept: OBGYN | Facility: CLINIC | Age: 31
End: 2021-11-07
Payer: COMMERCIAL

## 2021-11-07 NOTE — PROGRESS NOTES
Received page from patient's , Amauri, wondering about pain control.    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  Amauri states patient has continued to have pain since surgery. States she is having worse pain with movement at home. She is getting scheduled tylenol and ibuprofen and oxycodone as needed. She received one dose of Miralax yesterday and one dose of Senna today. Denies fevers, chills, erythema or oozing from incision. Voiding spontaneously. He states he is not sure if she has passed gas yet, has not yet had a bowel movement. States she does not feel bloated. Tolerated good amount of food today. Sleeping at time of call.    Discussed that it is normal to have pain after abdominal surgery and that I would not expect the patient to have zero pain so soon after surgery. Without symptoms of infection, ongoing bleeding, or ileus, suspect patient is having normal post-operative pain. Encouraged Amauri to provide more bowel regimen medications, recommended patient take both Miralax and Senna this evening. Discussed call back or return precautions of developing symptoms of infection or if patient is not passing flatus. Additionally recommended heat packs.     Ramirez Fishman MD  Obstetrics, Gynecology, and Women's Health  PGY2  2:38 PM 11/07/2021

## 2021-11-08 ENCOUNTER — TELEPHONE (OUTPATIENT)
Dept: ONCOLOGY | Facility: CLINIC | Age: 31
End: 2021-11-08
Payer: COMMERCIAL

## 2021-11-08 DIAGNOSIS — Z90.710 STATUS POST HYSTERECTOMY: ICD-10-CM

## 2021-11-08 RX ORDER — OXYCODONE HYDROCHLORIDE 5 MG/1
5 TABLET ORAL EVERY 12 HOURS PRN
Qty: 6 TABLET | Refills: 0 | Status: SHIPPED | OUTPATIENT
Start: 2021-11-08

## 2021-11-08 NOTE — TELEPHONE ENCOUNTER
oxyCODONE (ROXICODONE) 5 MG tablet  Last prescribing provider: Jonathan    Last clinic visit date: had procedure on 11/5/21- Single-incision laparoscopic BILATERAL SALPINGO-OOPHORECTOMY  Laparoscopic hysterectomy total, Omentectomy, Pelvic washings, Peritoneal biopsies     Any missed appointments or no-shows since last clinic visit?: no    Recommendations for requested medication (if none, N/A): n/a    Next clinic visit date: 11/23/21    Any other pertinent information (if none, N/A): none

## 2021-11-08 NOTE — TELEPHONE ENCOUNTER
Called patient and connected with Parrish, patient's , and he reports they are alternating with tylenol and ibuprofen. Oxycodone is only being taking at bedtime at this time and they will work on weaning off.    Otilia Lehman RN, BSN, PHN  [facility]

## 2021-11-09 LAB
PATH REPORT.COMMENTS IMP SPEC: NORMAL
PATH REPORT.COMMENTS IMP SPEC: NORMAL
PATH REPORT.FINAL DX SPEC: NORMAL
PATH REPORT.GROSS SPEC: NORMAL
PATH REPORT.MICROSCOPIC SPEC OTHER STN: NORMAL
PATH REPORT.RELEVANT HX SPEC: NORMAL

## 2021-11-09 NOTE — OP NOTE
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    Procedure(s):  Single-incision laparoscopic bilateral salpingo-oophorectomy, total laparoscopic hysterectomy, lymph node sampling, omentectomy, peritoneal biopsies      Surgeon(s) and Role:     * Ramirez Castillo MD - Primary     * Rafaela Lopes MD - Assisting    Anesthesia:    General with block    EBL: 50mL    Drains: None, lucas during the case    Specimen(s):  ID Type Source Tests Collected by Time Destination   1 : pelvic washings Washings Pelvis NON-GYNECOLOGIC CYTOLOGY Ramirez Castillo MD 11/5/2021  4:03 PM    2 : Right Tube and Ovary  Tissue Ovary and Fallopian Tube, Right SURGICAL PATHOLOGY EXAM Ramirez Castillo MD 11/5/2021  4:08 PM    3 : Enlarged Right External iliac Lymph node Tissue Lymph Node(s) SURGICAL PATHOLOGY EXAM Ramirez Castillo MD 11/5/2021  5:43 PM    4 : UTERUS, CERVIX AND LEFT FALLOPIAN TUBE AND OVARY Tissue Uterus, Cervix SURGICAL PATHOLOGY EXAM Ramirez Castillo MD 11/5/2021  6:12 PM    5 : additional Right Gonadal vessel Tissue Other SURGICAL PATHOLOGY EXAM Ramirez Castillo MD 11/5/2021  6:15 PM    6 : Bladder peritoneal biopsy  Biopsy Other SURGICAL PATHOLOGY EXAM Ramirez Castillo MD 11/5/2021  6:29 PM    7 : Right Pelvic side wall biopsy Biopsy Other SURGICAL PATHOLOGY EXAM Ramirez Castillo MD 11/5/2021  6:30 PM    8 : Left Pelvic side wall biopsy Biopsy Other SURGICAL PATHOLOGY EXAM Ramirez Castillo MD 11/5/2021  6:30 PM    9 : Posterior culdasac biopsy Biopsy Other SURGICAL PATHOLOGY EXAM Ramirez Castillo MD 11/5/2021  6:33 PM    10 : Right Paracolic gutter biopsy Biopsy Other SURGICAL PATHOLOGY EXAM Ramirez Castillo MD 11/5/2021  6:34 PM    11 : left Paracolic gutter biopsy Biopsy Other SURGICAL  PATHOLOGY EXAM Ramirez Castillo MD 2021  6:36 PM    12 : omentum Tissue Omentum SURGICAL PATHOLOGY EXAM Ramirez Castillo MD 2021  6:40 PM        Findings: Bimanual with retroverted uterus, large mobile mass in the midline. Vagina and rectum without nodularity or masses. On laparoscopy, normal appearing liver surface, diaphragm, stomach edge. Omentum, large and small bowel unremarkable. Appendix normal in appearance. Peritoneum without implants. Enlarged right external iliac lymph node (removed) otherwise pelvic nodes and para-aortic nodes palpated through single site port and not enlarged.  Smooth appearing 20 cm right ovarian mass, mucinous contents with drainage in Endocatch bag. Frozen: at least borderline mucinous with concern for at least intra-epithelial adenocarcinoma. Uterus, left ovary and fallopian tubes normal in appearance. Bilateral ureters visualized vermiculating before and following procedure. Vaginal cuff intact, hemostatic at end of procedure     Indication:  Shanna Smith is a 31 year old  with complex ovarian cyst noted during pregnancy which enlarged on subsequent imaging. Plan was made for removal after delivery. Tumor markers were as expected during pregnancy or normal and those affected by pregnancy were repeated post-partum and normal. She was counseled about removal of the mass and affected tube as well as possible full staging procedure including hysterectomy and removal of both tubes and ovaries and consented to that procedure.     Description of Procedure:  Informed consent had previously been obtained, the patient was taken to the Operating Room where a surgical time-out was performed. Anesthesia as noted above was administered. She was placed in dorsal low lithotomy position in Ajith stirrups with her arms tucked at her sides. She was prepped and draped in the usual sterile fashion. A Huynh catheter was placed into her bladder and a uterine manipulator was placed in  her uterus. Gloves were changed and attention turned to the abdomen    The umbilicus was everted using Allis clamps. A 2.5cm midline vertical skin incision through the base of the umbilicus was made with the scalpel. This was carried to the fascia with cautery. The fascia was grasped with Kocher clamps, and incised with Ocampo scissors. The fascia was extended to about 3 cm. The peritoneum was grasped with tonsils, tented up, and entered sharply with [facility]. The Gel-Foam single port apparatus was placed through the incision ensuring no loops of bowel were caught under the apparatus. The abdomen insufflated to 15 mmHg with carbon dioxide.  The above findings were noted. Pelvic washing were obtained for cytology. The patient was placed in Trendelenberg and the bowels were then moved cephalad.    The gonadal vessels were transected after identifying the ureter. The fallopian tube was followed to the fundus and the utero-ovarian ligament was transected. A large [place] bag was placed through the gel port and the ovary was placed inside the bag wholly intact. The bag was closed and brought through the umbilical port. The cyst was partially decompressed using a syringe to relieve the pressure prior to puncturing the capsule. A small hole was then made to allow placement of the suction into the mass and mucinous contents were drained. Additional cysts within the cyst were also drained. Once the cyst had been decompressed within the bag, the bag was removed. All utilized tools were placed off the field. Gloves were changed as was the suction tip. The ovary was sent for frozen pathology. The camera was replaced which confirmed no spillage of contents into the abdomen.     After frozen section returned, I spoke with the patient's  to confirm her wishes for complete staging surgery, which he confirmed. Thus we placed two additional 5mm ports one on each side in the pelvis. The retroperitoneum was opened further on the right  and the ureter was visualized. The remant gonadal vessels were skeletonized, cauterized and transected with care to visualize the ureter far from the dissection at all times. These were sent separately from the rest of the specimen. The left retroperitoneum was opened and the ureter visualized. The gonadal vessels were sealed and transected. The same procedure was carried out on the contralateral side. The round ligament was then cauterized and divided, followed by division of the vesicouterine peritoneum. The contralateral round ligament was transected and the anterior peritoneum divided to complete dissection of the vesicouterine peritoneum and reflect the bladder to be reflected over the lower uterine segment, cervix and proximal vagina. The posterior peritoneum was transected toward the cervix bilaterally. The uterine arteries were then skeletonized, cauterized and transected, followed by cautery and transection of the cardinal and uterosacral ligaments. Once below the level of the cervix, assuring that the bladder, rectum and ureters were out of the surgical field, an anterior colpotomy was created using the monopolar hook and carried circumferentially around the base of the cervix,  it from the vaginal attachments.     The specimen was retrieved through the vaginal opening and sent to permanent pathology. The vaginal cuff was then closed using V-lock suture in a running fashion with the final stitch brought through the cuff medial to the prior to lock down the stitch. The abdomen and pelvis irrigated and hemostasis was confirmed. Peritoneal biopsies were taken using the cautery hook.    Attention was turned to the pelvic haydee beds. The para-vesical spaces were opened and the haydee beds were visualized. There was a prominent enlarged lymph node on the right that was removed. No other enlarged lymph nodes. The para-aortic lymph node beds were visually normal. The gel port was then removed and the  para-aortic lymph node beds were palpated to the level of the renal vessels with no enlarged nodes palpated. The omentum was fully visualized and palpated and then a total infra-colic omentectomy was completed. This was performed through the gelpoint outside the abdomen by  it from the transverse colon and transected the pedicles with the Ligasure.     All instruments were then removed and counts confirmed as correct. The fascia was then closed using 0 Vicryl starting in each direction and tied down in the midline. The skin was closed with 4-0 Monocryl interrupted sutures as were the 5mm port sites. A sterile pressure dressing was placed over the umbilicus and skin glue over the other port sites. The lucas was removed in the OR. The patient tolerated the procedure well and was reversed from general endotracheal anesthesia and taken to the recovery room in stable condition.    Ramirez Castillo MD     Gynecologic Oncology

## 2021-11-09 NOTE — PROGRESS NOTES
I spoke to Shanna's , Amauri, during her procedure to update him on surgical findings. Frozen pathology showed at least mucinous borderline; however, there are areas that are concerning for possible cancer. I had previously consented Shanna for a hysterectomy and removal of both tubes and ovaries, and this is the procedure that I recommended to him based upon those results. However, I wanted to confirm that his understanding was also that this is what she would want. We again reviewed the sterilizing nature of this procedure. He agreed saying they had just talked this morning about her wanting everything removed (uterus and both ovaries) if there was concern for cancer or if that was the recommendations. As such, I will proceed with staging surgery as previously discussed with the patient.     Ramirez Castillo MD    Gynecologic Oncology

## 2021-11-11 ENCOUNTER — TELEPHONE (OUTPATIENT)
Dept: SURGERY | Facility: CLINIC | Age: 31
End: 2021-11-11
Payer: COMMERCIAL

## 2021-11-11 LAB
PATH REPORT.COMMENTS IMP SPEC: NORMAL
PATH REPORT.FINAL DX SPEC: NORMAL
PATH REPORT.GROSS SPEC: NORMAL
PATH REPORT.INTRAOP OBS SPEC DOC: NORMAL
PATH REPORT.MICROSCOPIC SPEC OTHER STN: NORMAL
PATH REPORT.RELEVANT HX SPEC: NORMAL
PATHOLOGY SYNOPTIC REPORT: NORMAL
PHOTO IMAGE: NORMAL

## 2021-11-11 NOTE — TELEPHONE ENCOUNTER
I called Shanna and her  to relay pathology results which showed no evidence of cancer. 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 Her final stage would be Stage 1A. We will discuss these results in more detail at her post-op appointment. She is otherwise doing well post-operatively.    Ramirez Castillo MD    Gynecologic Oncology

## 2021-11-12 ENCOUNTER — E-VISIT (OUTPATIENT)
Dept: FAMILY MEDICINE | Facility: CLINIC | Age: 31
End: 2021-11-12
Payer: COMMERCIAL

## 2021-11-12 DIAGNOSIS — H92.09 OTALGIA, UNSPECIFIED LATERALITY: Primary | ICD-10-CM

## 2021-11-12 PROCEDURE — 99207 PR NON-BILLABLE SERV PER CHARTING: CPT | Performed by: FAMILY MEDICINE

## 2021-11-15 NOTE — PATIENT INSTRUCTIONS
Thank you for choosing us for your care. I think an in-clinic visit would be best next steps based on your symptoms. Please schedule a clinic appointment; you won t be charged for this eVisit.      You can schedule an appointment right here in [facility], or call [phone]

## 2021-11-23 ENCOUNTER — ONCOLOGY VISIT (OUTPATIENT)
Dept: ONCOLOGY | Facility: CLINIC | Age: 31
End: 2021-11-23
Payer: COMMERCIAL

## 2021-11-23 VITALS
WEIGHT: 212.9 LBS | HEART RATE: 79 BPM | DIASTOLIC BLOOD PRESSURE: 81 MMHG | TEMPERATURE: 98 F | HEIGHT: 69 IN | BODY MASS INDEX: 31.53 KG/M2 | RESPIRATION RATE: 16 BRPM | SYSTOLIC BLOOD PRESSURE: 125 MMHG | OXYGEN SATURATION: 97 %

## 2021-11-23 DIAGNOSIS — E89.40 PREMATURE SURGICAL MENOPAUSE: ICD-10-CM

## 2021-11-23 DIAGNOSIS — C56.9 MUCINOUS CYSTADENOMA, BORDERLINE MALIGNANCY (H): Primary | ICD-10-CM

## 2021-11-23 PROCEDURE — 99213 OFFICE O/P EST LOW 20 MIN: CPT | Mod: 24 | Performed by: OBSTETRICS & GYNECOLOGY

## 2021-11-23 RX ORDER — ESTRADIOL 0.1 MG/G
2 CREAM VAGINAL
Qty: 42.5 G | Refills: 0 | Status: SHIPPED | OUTPATIENT
Start: 2021-11-24 | End: 2021-11-26

## 2021-11-23 RX ORDER — ESTRADIOL 0.05 MG/D
1 PATCH, EXTENDED RELEASE TRANSDERMAL
Qty: 8 PATCH | Refills: 3 | Status: SHIPPED | OUTPATIENT
Start: 2021-11-25 | End: 2021-11-26

## 2021-11-23 ASSESSMENT — PAIN SCALES - GENERAL: PAINLEVEL: NO PAIN (0)

## 2021-11-23 ASSESSMENT — MIFFLIN-ST. JEOR: SCORE: 1745.34

## 2021-11-23 NOTE — NURSING NOTE
"Oncology Rooming Note    November 23, 2021 2:40 PM   Shanna Smith is a 31 year old female who presents for:    Chief Complaint   Patient presents with     Oncology Clinic Visit     Post-op follow up 11/5     Initial Vitals: /81 (BP Location: Right arm)   Pulse 79   Temp 98  F (36.7  C) (Oral)   Resp 16   Ht 1.753 m (5' 9.02\")   Wt 96.6 kg (212 lb 14.4 oz)   LMP 12/18/2020 (Exact Date)   SpO2 97%   Breastfeeding Yes   BMI 31.43 kg/m   Estimated body mass index is 31.43 kg/m  as calculated from the following:    Height as of this encounter: 1.753 m (5' 9.02\").    Weight as of this encounter: 96.6 kg (212 lb 14.4 oz). Body surface area is 2.17 meters squared.  No Pain (0) Comment: Data Unavailable   Patient's last menstrual period was 12/18/2020 (exact date).  Allergies reviewed: Yes  Medications reviewed: Yes    Medications: Medication refills not needed today.  Pharmacy name entered into [facility]:    [facility] - [place], [place] - [address]  [id number] - [place], [place] - [address] AT [address] & [id number] - [place], [place] - [address] AT [address]    Clinical concerns: no new concerns       Shanta Gracia LPN              "

## 2021-11-23 NOTE — LETTER
"    11/23/2021         RE: Shanna Smith  [address] [facility] [address]        Dear Colleague,    Thank you for referring your patient, Shanna Smith, to the [facility]. Please see a copy of my visit note below.    Gynecologic Oncology Clinic - Established Patient Visit    Visit date: Nov 23, 2021     CC: pathology review and planning, mucinous borderline tumor     Interval history: Shanna Smith is a 31 year old who underwent Total laparoscopic hysterectomy, bilateral salpingo-oophorectomy, biopsies   for mucinous borderline tumor. She is here today for pathology reviw and treatment recommendations.    She is overall feeling well. She is eating and drinking well. In general she has no significant diarrhea/constipation or bladder concerns. Her pain is minimal at this point. She doesn't have any incisional concerns. She denies vaginal bleeding, some pink discharge. No hot flashes or night sweats. She is still breastfeeding.      Review of Systems:  As per HPI, otherwise non-contributory.     Past Surgical History:  Past Surgical History:   Procedure Laterality Date     LAPAROSCOPIC HYSTERECTOMY TOTAL N/A 11/5/2021    Procedure: Laparoscopic hysterectomy total;  Surgeon: Ramirez Castillo MD;  Location: [facility] OR     LAPAROSCOPIC OMENTECTOMY N/A 11/5/2021    Procedure: Laparoscopic Omentectomy;  Surgeon: Ramirez Castillo MD;  Location: [facility] OR     LAPAROSCOPIC SINGLE SITE SALPINGO-OOPHORECTOMY Right 11/5/2021    Procedure: Diagnostic laparoscopy, single-incision laparoscopic;  Surgeon: Ramirez Castillo MD;  Location: [facility] OR     SALPINGO-OOPHORECTOMY, COMBINED N/A 11/5/2021    Procedure: BILATERAL SALPINGO-OOPHORECTOMY;  Surgeon: Ramirez Castillo MD;  Location: [facility] OR     WISDOM TOOTH EXTRACTION          Physical Exam:  /81 (BP Location: Right arm)   Pulse 79   Temp 98  F (36.7  C) (Oral)   Resp 16   Ht 1.753 m (5' 9.02\")   Wt 96.6 kg (212 lb 14.4 oz)   LMP " 12/18/2020 (Exact Date)   SpO2 97%   Breastfeeding Yes   BMI 31.43 kg/m     General appearance: no acute distress, well groomed, sitting comfortably   GI: abdomen soft, incisions well healed    Pathology:  Surgical Pathology Report                         Case: [id number]                                   Authorizing Provider:  Ramirez Castillo MD         Collected:           11/05/2021 04:08 PM           Ordering Location:      MAIN OR                 Received:            11/05/2021 04:33 PM           Pathologist:           Carmen Sahni MD                                                              Intraop:               Carina Zhang MD                                                    Specimens:   A) - Ovary and Fallopian Tube, Right, Right Tube and Ovary                                           B) - Lymph Node(s), Enlarged Right External iliac Lymph node                                         C) - Uterus, Cervix, UTERUS, CERVIX AND LEFT FALLOPIAN TUBE AND OVARY                                D) - Other, additional Right Gonadal vessel                                                          E) - Other, Bladder peritoneal biopsy                                                                F) - Other, Right Pelvic side wall biopsy                                                            G) - Other, Left Pelvic side wall biopsy                                                             H) - Other, Posterior culdasac biopsy                                                                I) - Other, Right Paracolic gutter biopsy                                                            J) - Other, left Paracolic gutter biopsy                                                             K) - Omentum, omentum                                                                      Final Diagnosis   A.   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  Lymph node, right external iliac, excision:  - Two lymph nodes, negative for malignancy (0/2)     C.  Uterus, cervix and left fallopian tube and ovary, total hysterectomy with left salpingo-oophorectomy:  - Proliferative-type endometrium with placental implantation site  - Myometrium with pregnancy-related vascular changes  - Unremarkable cervix  - Fallopian tube with simple paratubal cysts  - Ovary with multiple follicular cysts  - Negative for malignancy     D.  Soft tissue, right gonadal vessel, excision:  - Unremarkable fibroadipose tissue and vein walls  - Negative for malignancy     E.  Soft tissue, bladder peritoneum, biopsy:  - Unremarkable peritoneal tissue  - Negative for malignancy     F.  Soft tissue, right pelvic side wall, biopsy:  - Unremarkable peritoneal tissue  - Negative for malignancy     G.  Soft tissue, left pelvic side wall, biopsy:  - Unremarkable fibroadipose and peritoneal tissue  - Negative for malignancy     H.  Soft tissue, posterior cul-de-sac, biopsy:  - Unremarkable fibroadipose tissue with mesothelial lining  - Negative for malignancy     I.  Soft tissue, right paracolic gutter, biopsy:  - Unremarkable fibroadipose tissue with mesothelial liniging  - Negative for malignancy     J.  Soft tissue, left paracolic gutter, biopsy:  - Unremarkable fibroadipose tissue with mesothelial lining  - Negative for malignancy     K.  Omentum, omentectomy:  - Unremarkable adipose tissue  - Negative for malignancy   Electronically signed by Carmen Sahni MD on 11/11/2021 at 11:51 AM   Comment  [facility]   Per operative note, the right ovary was resected intact and placed inside a bag.  Specimen rupture occurred within the bag.  The final diagnosis confirms the interpretation provided intraoperatively.    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    Tumor Size  Greatest Dimension (Centimeters): 17.3 cm   Additional Dimension (Centimeters)  9.6 cm     2.5 cm   Ovarian Surface Involvement  Absent    Fallopian Tube Surface Involvement  Absent    Implants  Not identified    Other Tissue / Organ Involvement  Not identified    Peritoneal / Ascitic Fluid  Negative for malignancy (normal / benign)    Pleural Fluid  Not submitted / unknown    LYMPH NODES   Regional Lymph Nodes  All lymph nodes negative for tumor cells    Number of Lymph Nodes Examined  2    Site(s)  Right pelvic    PATHOLOGIC STAGE CLASSIFICATION (pTNM, AJCC 8th Edition)   Primary Tumor (pT)  pT1a    Regional Lymph Nodes (pN)  pN0    FIGO STAGE   FIGO Stage  IA    ADDITIONAL FINDINGS   Additional Findings  None identified    Comment(s)   Comment(s)  Best block for ancillary tests A8    .      Clinical Information  [facility]   The patient is a 31-year-old woman with a complex ovarian cyst arising in the right ovary, initially noted during pregnancy.  Tumor markers were within normal limits.   Intraoperative Consultation  [facility]   A(2). Ovary and Fallopian Tube, Right, Right Tube and Ovary :  At least borderline mucinous neoplasm.       Assessment:  Shanna is a 31 year old  iatrogenically post-menopausal female with history of Stage 1A mucinous borderline tumor.    Plan:  1. -Mucinous Borderline tumor of the ovary: We reviewed the diagnosis of borderline  ovarian tumor. This is not a cancerous diagnosis. In general these behave like a benign tumor; however, they do have a risk of recurrence, which is why they receive their own classification.     2. These tumors do have a risk of 'recurrence'. For patients in whom both ovaries have been removed, this risk is <5%. In the rare event of a recurrence, there is a <1% chance of a recurrence as a cancer. This is particularly rare with mucinous borderline tumors. The intra-epithelial carcinoma comment may slightly increase the risk of recurrence, but it is difficult to quantify this risk, particularly in a Stage 1A tumor that is fully resected.    3. Based upon NCCN guidelines, she should have regular follow-up. This is outlined below.    4. Follow-up: According to NCCN guidelines, should have a visit with pelvic exam every 6 months for 5 years; however, in a patient in whom both ovaries have been removed, this may be over surveillance. Thus, I would recommend starting with every 6 months for 2 years and then consideration of annual screening to 5 years. This exam can be done by a Gynecologic Oncology clinic, if she has access to one. Otherwise, it may be done by a general Ob/Gyn.  a. During each visit, the provider should review any symptoms that could suggest pelvic recurrence such as pelvic fullness, bloating, pain/pressure, change in bladder or bowel habits.   b. She should have an abdominal and pelvic exam including a bimanual exam. Any concerns should be followed up with a transvaginal ultrasound or CT imaging depending upon the concern.   c. Tumor markers were all normal pre-operatively, thus I would not recommend these be checked routinely    5. Pre-menopausal oophorectomy: we reviewed the importance of hormone replacement therapy to replace the estrogen the ovaries would be producing. This should be ocntinued until the age of natural menopause around age 50. I would recommend we start with a low dose of estradiol  transdermal of 0.05mg/day given her post-partum state. After a few months on this dose, we can increase to estradiol 0.1mg/day indefinitely. If she develops hot flashes or night sweats, we will increase the dose sooner.   6. Post-op recovery: continue post-op restrictions to 6 weeks post-op. I would also recommend vaginal estrogen to aid in healing given her low estrogenic state post-partum/breastfeeding. Estrace cream 2g vaginally 3x/week for the next month to aid in healing of the vaginal cuff.     I spent a total of 35 minutes with Shanna Smith during today's office visit. Of this, 10 minutes were spent in post-op discussion and routine post-op care. The remainder of the time was spent in discussion of plan related to her pathology results and counseling regarding the risks/benefits/alternatives and recommended follow-up related to her diagnosis.    Ramirez Castillo MD     Gynecologic Oncology       Again, thank you for allowing me to participate in the care of your patient.        Sincerely,        Ramirez Castillo MD

## 2021-11-24 NOTE — PROGRESS NOTES
"Gynecologic Oncology Clinic - Established Patient Visit    Visit date: Nov 23, 2021     CC: pathology review and planning, mucinous borderline tumor     Interval history: Shanna Smith is a 31 year old who underwent Total laparoscopic hysterectomy, bilateral salpingo-oophorectomy, biopsies   for mucinous borderline tumor. She is here today for pathology reviw and treatment recommendations.    She is overall feeling well. She is eating and drinking well. In general she has no significant diarrhea/constipation or bladder concerns. Her pain is minimal at this point. She doesn't have any incisional concerns. She denies vaginal bleeding, some pink discharge. No hot flashes or night sweats. She is still breastfeeding.      Review of Systems:  As per HPI, otherwise non-contributory.     Past Surgical History:  Past Surgical History:   Procedure Laterality Date     LAPAROSCOPIC HYSTERECTOMY TOTAL N/A 11/5/2021    Procedure: Laparoscopic hysterectomy total;  Surgeon: Ramirez Castillo MD;  Location: [facility]     LAPAROSCOPIC OMENTECTOMY N/A 11/5/2021    Procedure: Laparoscopic Omentectomy;  Surgeon: Ramirez Castillo MD;  Location: [facility]     LAPAROSCOPIC SINGLE SITE SALPINGO-OOPHORECTOMY Right 11/5/2021    Procedure: Diagnostic laparoscopy, single-incision laparoscopic;  Surgeon: Ramirez Castillo MD;  Location: [facility]     SALPINGO-OOPHORECTOMY, COMBINED N/A 11/5/2021    Procedure: BILATERAL SALPINGO-OOPHORECTOMY;  Surgeon: Ramirez Castillo MD;  Location: [facility]     WISDOM TOOTH EXTRACTION          Physical Exam:  /81 (BP Location: Right arm)   Pulse 79   Temp 98  F (36.7  C) (Oral)   Resp 16   Ht 1.753 m (5' 9.02\")   Wt 96.6 kg (212 lb 14.4 oz)   LMP 12/18/2020 (Exact Date)   SpO2 97%   Breastfeeding Yes   BMI 31.43 kg/m     General appearance: no acute distress, well groomed, sitting comfortably   GI: abdomen soft, incisions well healed    Pathology:  Surgical Pathology Report                         Case: [id number] "                                   Authorizing Provider:  Ramirez Castillo MD         Collected:           11/05/2021 04:08 PM           [facility]ing Location[facility]                 Received:            11/05/2021 04:33 PM           Pathologist:           Carmen Sahni MD                                                              Intraop:               Carina Zhang MD                                                    Specimens:   A) - Ovary and Fallopian Tube, Right, Right Tube and Ovary                                           B) - Lymph Node(s), Enlarged Right External iliac Lymph node                                         C) - Uterus, Cervix, UTERUS, CERVIX AND LEFT FALLOPIAN TUBE AND OVARY                                D) - Other, additional Right Gonadal vessel                                                          E) - Other, Bladder peritoneal biopsy                                                                F) - Other, Right Pelvic side wall biopsy                                                            G) - Other, Left Pelvic side wall biopsy                                                             H) - Other, Posterior culdasac biopsy                                                                I) - Other, Right Paracolic gutter biopsy                                                            J) - Other, left Paracolic gutter biopsy                                                             K) - Omentum, omentum                                                                      Final Diagnosis   A.  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  Lymph node, right external iliac, excision:  - Two lymph nodes, negative for malignancy  (0/2)     C.  Uterus, cervix and left fallopian tube and ovary, total hysterectomy with left salpingo-oophorectomy:  - Proliferative-type endometrium with placental implantation site  - Myometrium with pregnancy-related vascular changes  - Unremarkable cervix  - Fallopian tube with simple paratubal cysts  - Ovary with multiple follicular cysts  - Negative for malignancy     D.  Soft tissue, right gonadal vessel, excision:  - Unremarkable fibroadipose tissue and vein walls  - Negative for malignancy     E.  Soft tissue, bladder peritoneum, biopsy:  - Unremarkable peritoneal tissue  - Negative for malignancy     F.  Soft tissue, right pelvic side wall, biopsy:  - Unremarkable peritoneal tissue  - Negative for malignancy     G.  Soft tissue, left pelvic side wall, biopsy:  - Unremarkable fibroadipose and peritoneal tissue  - Negative for malignancy     H.  Soft tissue, posterior cul-de-sac, biopsy:  - Unremarkable fibroadipose tissue with mesothelial lining  - Negative for malignancy     I.  Soft tissue, right paracolic gutter, biopsy:  - Unremarkable fibroadipose tissue with mesothelial liniging  - Negative for malignancy     J.  Soft tissue, left paracolic gutter, biopsy:  - Unremarkable fibroadipose tissue with mesothelial lining  - Negative for malignancy     K.  Omentum, omentectomy:  - Unremarkable adipose tissue  - Negative for malignancy   Electronically signed by Carmen Sahni MD on 11/11/2021 at 11:51 AM   Comment  [facility]   Per operative note, the right ovary was resected intact and placed inside a bag.  Specimen rupture occurred within the bag.  The final diagnosis confirms the interpretation provided intraoperatively.   Case was reviewed by the following resident: ERNESTO EDGAR   Synoptic Checklist     OVARY or FALLOPIAN TUBE or PRIMARY PERITONEUM  8th Edition - Protocol posted: 2/26/2020  OVARY OR FALLOPIAN TUBE OR PRIMARY PERITONEUM - A  SPECIMEN   Procedure  Total hysterectomy and  bilateral salpingo-oophorectomy    Hysterectomy Type  Laparoscopic         Specimen Integrity of Right Ovary  Capsule intact    TUMOR   Tumor Site  Right ovary    Histologic Type  Mucinous borderline tumor / atypical proliferative mucinous tumor with intraepithelial carcinoma    Tumor Size  Greatest Dimension (Centimeters): 17.3 cm   Additional Dimension (Centimeters)  9.6 cm     2.5 cm   Ovarian Surface Involvement  Absent    Fallopian Tube Surface Involvement  Absent    Implants  Not identified    Other Tissue / Organ Involvement  Not identified    Peritoneal / Ascitic Fluid  Negative for malignancy (normal / benign)    Pleural Fluid  Not submitted / unknown    LYMPH NODES   Regional Lymph Nodes  All lymph nodes negative for tumor cells    Number of Lymph Nodes Examined  2    Site(s)  Right pelvic    PATHOLOGIC STAGE CLASSIFICATION (pTNM, AJCC 8th Edition)   Primary Tumor (pT)  pT1a    Regional Lymph Nodes (pN)  pN0    FIGO STAGE   FIGO Stage  IA    ADDITIONAL FINDINGS   Additional Findings  None identified    Comment(s)   Comment(s)  Best block for ancillary tests A8    .      Clinical Information  [facility]   The patient is a 31-year-old woman with a complex ovarian cyst arising in the right ovary, initially noted during pregnancy.  Tumor markers were within normal limits.   Intraoperative Consultation  [facility]   A(2). Ovary and Fallopian Tube, Right, Right Tube and Ovary :  At least borderline mucinous neoplasm.       Assessment:  Shanna is a 31 year old  iatrogenically post-menopausal female with history of Stage 1A mucinous borderline tumor.    Plan:  1. -Mucinous Borderline tumor of the ovary: We reviewed the diagnosis of borderline ovarian tumor. This is not a cancerous diagnosis. In general these behave like a benign tumor; however, they do have a risk of recurrence, which is why they receive their own classification.     2. These tumors do have a risk of 'recurrence'. For patients in whom both ovaries  have been removed, this risk is <5%. In the rare event of a recurrence, there is a <1% chance of a recurrence as a cancer. This is particularly rare with mucinous borderline tumors. The intra-epithelial carcinoma comment may slightly increase the risk of recurrence, but it is difficult to quantify this risk, particularly in a Stage 1A tumor that is fully resected.    3. Based upon NCCN guidelines, she should have regular follow-up. This is outlined below.    4. Follow-up: According to NCCN guidelines, should have a visit with pelvic exam every 6 months for 5 years; however, in a patient in whom both ovaries have been removed, this may be over surveillance. Thus, I would recommend starting with every 6 months for 2 years and then consideration of annual screening to 5 years. This exam can be done by a Gynecologic Oncology clinic, if she has access to one. Otherwise, it may be done by a general Ob/Gyn.  a. During each visit, the provider should review any symptoms that could suggest pelvic recurrence such as pelvic fullness, bloating, pain/pressure, change in bladder or bowel habits.   b. She should have an abdominal and pelvic exam including a bimanual exam. Any concerns should be followed up with a transvaginal ultrasound or CT imaging depending upon the concern.   c. Tumor markers were all normal pre-operatively, thus I would not recommend these be checked routinely    5. Pre-menopausal oophorectomy: we reviewed the importance of hormone replacement therapy to replace the estrogen the ovaries would be producing. This should be ocntinued until the age of natural menopause around age 50. I would recommend we start with a low dose of estradiol transdermal of 0.05mg/day given her post-partum state. After a few months on this dose, we can increase to estradiol 0.1mg/day indefinitely. If she develops hot flashes or night sweats, we will increase the dose sooner.   6. Post-op recovery: continue post-op restrictions to 6  weeks post-op. I would also recommend vaginal estrogen to aid in healing given her low estrogenic state post-partum/breastfeeding. Estrace cream 2g vaginally 3x/week for the next month to aid in healing of the vaginal cuff.     I spent a total of 35 minutes with Shanna MASON Luis during today's office visit. Of this, 10 minutes were spent in post-op discussion and routine post-op care. The remainder of the time was spent in discussion of plan related to her pathology results and counseling regarding the risks/benefits/alternatives and recommended follow-up related to her diagnosis.    Ramirez Castillo MD     Gynecologic Oncology

## 2021-11-26 DIAGNOSIS — E89.40 PREMATURE SURGICAL MENOPAUSE: ICD-10-CM

## 2021-11-26 RX ORDER — ESTRADIOL 0.05 MG/D
1 PATCH, EXTENDED RELEASE TRANSDERMAL
Qty: 8 PATCH | Refills: 3 | Status: SHIPPED | OUTPATIENT
Start: 2021-11-29

## 2021-11-26 RX ORDER — ESTRADIOL 0.1 MG/G
2 CREAM VAGINAL
Qty: 42.5 G | Refills: 0 | Status: SHIPPED | OUTPATIENT
Start: 2021-11-26

## 2022-01-25 ENCOUNTER — LAB (OUTPATIENT)
Dept: URGENT CARE | Facility: URGENT CARE | Age: 32
End: 2022-01-25
Payer: COMMERCIAL

## 2022-01-25 DIAGNOSIS — Z20.822 SUSPECTED COVID-19 VIRUS INFECTION: ICD-10-CM

## 2022-01-25 LAB — SARS-COV-2 RNA RESP QL NAA+PROBE: POSITIVE

## 2022-01-25 PROCEDURE — U0003 INFECTIOUS AGENT DETECTION BY NUCLEIC ACID (DNA OR RNA); SEVERE ACUTE RESPIRATORY SYNDROME CORONAVIRUS 2 (SARS-COV-2) (CORONAVIRUS DISEASE [COVID-19]), AMPLIFIED PROBE TECHNIQUE, MAKING USE OF HIGH THROUGHPUT TECHNOLOGIES AS DESCRIBED BY CMS-2020-01-R: HCPCS

## 2022-01-25 PROCEDURE — U0005 INFEC AGEN DETEC AMPLI PROBE: HCPCS

## 2022-02-21 ENCOUNTER — TELEPHONE (OUTPATIENT)
Dept: ONCOLOGY | Facility: CLINIC | Age: 32
End: 2022-02-21

## 2022-02-21 ENCOUNTER — VIRTUAL VISIT (OUTPATIENT)
Dept: ONCOLOGY | Facility: CLINIC | Age: 32
End: 2022-02-21
Attending: OBSTETRICS & GYNECOLOGY
Payer: COMMERCIAL

## 2022-02-21 DIAGNOSIS — E89.40 PREMATURE SURGICAL MENOPAUSE: ICD-10-CM

## 2022-02-21 DIAGNOSIS — C56.9 MUCINOUS CYSTADENOMA, BORDERLINE MALIGNANCY (H): Primary | ICD-10-CM

## 2022-02-21 PROCEDURE — 99213 OFFICE O/P EST LOW 20 MIN: CPT | Mod: 95 | Performed by: OBSTETRICS & GYNECOLOGY

## 2022-02-21 RX ORDER — ESTRADIOL 0.1 MG/D
1 FILM, EXTENDED RELEASE TRANSDERMAL
Qty: 8 PATCH | Refills: 12 | Status: SHIPPED | OUTPATIENT
Start: 2022-02-21

## 2022-02-21 NOTE — TELEPHONE ENCOUNTER
FYI only - pt called Women's Health line in [facility] to change appt to Video visit. Call pt if there is any issue with this.

## 2022-02-21 NOTE — LETTER
2022         RE: Shanna Smith  [address]  [facility] [address]        Dear Colleague,    Thank you for referring your patient, Shanna Smith, to the [facility]. Please see a copy of my visit note below.    Shanna is a 32 year old who is being evaluated via a billable video visit.      How would you like to obtain your AVS? MyChart  If the video visit is dropped, the invitation should be resent by: Text to cell phone: [phone]  Will anyone else be joining your video visit? Sari COTE        Video-Visit Details    Type of service:  Video Visit    Video End Time:total time 25 min    Originating Location (pt. Location): Home    Distant Location (provider location):  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 She presents for a surveillance visit.    She is overall feeling well. She is using HRT estradiol patches without side effects. She has no abdominal pain or pelvic pain. She has no vaginal bleeding. She has no new concerns today.     Of note, she and her family may be relocating to another state to be closer to her step-daughters in the near future.      Review of Systems:  As per HPI, otherwise non-contributory.     Past Surgical History:  Past Surgical History:   Procedure Laterality Date     LAPAROSCOPIC HYSTERECTOMY TOTAL N/A 2021    Procedure: Laparoscopic hysterectomy total;  Surgeon: Ramirez Castillo MD;  [facility]: [facility]     LAPAROSCOPIC OMENTECTOMY N/A 2021    Procedure: Laparoscopic Omentectomy;  Surgeon: Ramirez Castillo MD;  [facility]: [facility]     LAPAROSCOPIC SINGLE SITE  SALPINGO-OOPHORECTOMY Right 2021    Procedure: Diagnostic laparoscopy, single-incision laparoscopic;  Surgeon: Ramirez Castillo MD;  Location: [facility]     SALPINGO-OOPHORECTOMY, COMBINED N/A 2021    Procedure: BILATERAL SALPINGO-OOPHORECTOMY;  Surgeon: Ramirez Castillo MD;  Location: [facility]     WISDOM TOOTH EXTRACTION          Physical Exam:  LMP 2020 (Exact Date)    General appearance: no acute distress, well groomed, sitting comfortably     Labs:  none    Imaging review:  none    Assessment:  Shanna is a 32 year old  iatrogenically post-menopausal female on HRT with a 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    Plan:  - We discussed that early stage mucinous borderline tumors have a very low risk of recurrence at baseline and even less when bilateral salpingo-oophorectomy has been completed. Generally I would recommend every 6 month follow-up for the first year or 2 after diagnosis and then once a year thereafter. This should include a pelvic exam. She can do this with a general gynecologist. The assessment should just include a thorough assessment of abdominal or pelvic symptoms and a abdominal and pelvic exam. They could also take over prescribing her hormone-replacement therapy. She should remain on her current dose until she has reached the age of natural menopause around age 50. At that time it can be discontinued or tapered. She had normal tumor markers prior to her surgery, so there is no need to follow any lab values.   - I recommended she contact an OB/Gyn office when she has relocated and ask them if she could follow with their clinic given her history.     - Continue estradiol 0.1mg/day transdermal twice weekly patches.     If she doesn't relocate, she should return in 6 months for surveillance visit.     Ramirez Castillo MD     Gynecologic Oncology         Again, thank you for allowing me to participate in the  care of your patient.        Sincerely,        Ramirez Castillo MD

## 2022-02-21 NOTE — PROGRESS NOTES
Shanna is a 32 year old who is being evaluated via a billable video visit.      How would you like to obtain your AVS? MyChart  If the video visit is dropped, the invitation should be resent by: Text to cell phone: [phone]  Will anyone else be joining your video visit? Sari     Acacia COTE        Video-Visit Details    Type of service:  Video Visit    Video End Time:total time 25 min    Originating Location (pt. 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 She presents for a surveillance visit.    She is overall feeling well. She is using HRT estradiol patches without side effects. She has no abdominal pain or pelvic pain. She has no vaginal bleeding. She has no new concerns today.     Of note, she and her family may be relocating to another state to be closer to her step-daughters in the near future.      Review of Systems:  As per HPI, otherwise non-contributory.     Past Surgical History:  Past Surgical History:   Procedure Laterality Date     LAPAROSCOPIC HYSTERECTOMY TOTAL N/A 2021    Procedure: Laparoscopic hysterectomy total;  Surgeon: Ramirez Castillo MD;  [facility]     LAPAROSCOPIC OMENTECTOMY N/A 2021    Procedure: Laparoscopic Omentectomy;  Surgeon: Ramirez Castillo MD;  [facility]     LAPAROSCOPIC SINGLE SITE SALPINGO-OOPHORECTOMY Right 2021    Procedure: Diagnostic laparoscopy, single-incision laparoscopic;  Surgeon: Ramirez Castillo MD;  [facility]     SALPINGO-OOPHORECTOMY, COMBINED N/A 2021    Procedure: BILATERAL SALPINGO-OOPHORECTOMY;  Surgeon: Jonathan  MD Ramirez;  Location: [facility]     WISDOM TOOTH EXTRACTION          Physical Exam:  LMP 2020 (Exact Date)    General appearance: no acute distress, well groomed, sitting comfortably     Labs:  none    Imaging review:  none    Assessment:  Shanna is a 32 year old  iatrogenically post-menopausal female on HRT with a 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    Plan:  - We discussed that early stage mucinous borderline tumors have a very low risk of recurrence at baseline and even less when bilateral salpingo-oophorectomy has been completed. Generally I would recommend every 6 month follow-up for the first year or 2 after diagnosis and then once a year thereafter. This should include a pelvic exam. She can do this with a general gynecologist. The assessment should just include a thorough assessment of abdominal or pelvic symptoms and a abdominal and pelvic exam. They could also take over prescribing her hormone-replacement therapy. She should remain on her current dose until she has reached the age of natural menopause around age 50. At that time it can be discontinued or tapered. She had normal tumor markers prior to her surgery, so there is no need to follow any lab values.   - I recommended she contact an OB/Gyn office when she has relocated and ask them if she could follow with their clinic given her history.     - Continue estradiol 0.1mg/day transdermal twice weekly patches.     If she doesn't relocate, she should return in 6 months for surveillance visit.     Ramirez Castillo MD     Gynecologic Oncology

## 2022-10-18 ENCOUNTER — OFFICE VISIT (OUTPATIENT)
Dept: OBSTETRICS AND GYNECOLOGY | Facility: CLINIC | Age: 32
End: 2022-10-18

## 2022-10-18 VITALS
BODY MASS INDEX: 30.51 KG/M2 | WEIGHT: 206 LBS | HEIGHT: 69 IN | SYSTOLIC BLOOD PRESSURE: 120 MMHG | DIASTOLIC BLOOD PRESSURE: 70 MMHG

## 2022-10-18 DIAGNOSIS — Z90.710 H/O HYSTERECTOMY FOR BENIGN DISEASE: ICD-10-CM

## 2022-10-18 DIAGNOSIS — Z01.419 ENCOUNTER FOR GYNECOLOGICAL EXAMINATION WITHOUT ABNORMAL FINDING: Primary | ICD-10-CM

## 2022-10-18 DIAGNOSIS — Z79.890 HORMONE REPLACEMENT THERAPY (POSTMENOPAUSAL): ICD-10-CM

## 2022-10-18 PROCEDURE — 99385 PREV VISIT NEW AGE 18-39: CPT | Performed by: NURSE PRACTITIONER

## 2022-10-18 RX ORDER — ESTRADIOL 0.05 MG/D
FILM, EXTENDED RELEASE TRANSDERMAL
COMMUNITY
Start: 2022-07-29 | End: 2022-10-18 | Stop reason: SDUPTHER

## 2022-10-18 RX ORDER — ESTRADIOL 0.1 MG/D
1 FILM, EXTENDED RELEASE TRANSDERMAL 2 TIMES WEEKLY
Qty: 8 PATCH | Refills: 12 | Status: SHIPPED | OUTPATIENT
Start: 2022-10-20

## 2022-10-18 NOTE — PROGRESS NOTES
Uche Mejias is a 32 y.o. here for annual exam and needs refills on estrogen patches. Moved here this year from [place] and is establishing care.    History of Present Illness  TVH/BSO 2021 for ovarian cysts  Last pap- 2021 normal  Gynecologic Exam  The patient's pertinent negatives include no genital itching, genital lesions, genital odor, genital rash, pelvic pain, vaginal bleeding or vaginal discharge. Pertinent negatives include no abdominal pain, constipation, diarrhea, dysuria or urgency. She is sexually active. No, her partner does not have an STD. She uses hysterectomy for contraception. She is postmenopausal.       The following portions of the patient's history were reviewed and updated as appropriate: allergies, current medications, past family history, past medical history, past social history, past surgical history and problem list.    Review of Systems   Constitutional: Negative for activity change, appetite change, diaphoresis, fatigue, unexpected weight gain and unexpected weight loss.   Respiratory: Negative for chest tightness and shortness of breath.    Cardiovascular: Negative for chest pain and palpitations.   Gastrointestinal: Negative for abdominal distention, abdominal pain, constipation and diarrhea.   Endocrine: Negative.    Genitourinary: Negative for breast discharge, breast lump, breast pain, decreased libido, decreased urine volume, difficulty urinating, dyspareunia, dysuria, pelvic pain, pelvic pressure, urgency, urinary incontinence, vaginal bleeding, vaginal discharge and vaginal pain.   Musculoskeletal: Negative for myalgias.   Skin: Negative for color change, dry skin and skin lesions.   Neurological: Negative for light-headedness and headache.   Psychiatric/Behavioral: Negative for agitation, dysphoric mood, sleep disturbance, depressed mood and stress. The patient is not nervous/anxious.        Objective   Physical Exam  Vitals and nursing note reviewed.    Constitutional:       General: She is awake. She is not in acute distress.     Appearance: Normal appearance. She is well-developed and well-groomed. She is obese. She is not ill-appearing, toxic-appearing or diaphoretic.   Neck:      Thyroid: No thyromegaly.   Cardiovascular:      Rate and Rhythm: Normal rate and regular rhythm.      Heart sounds: Normal heart sounds.   Pulmonary:      Effort: Pulmonary effort is normal.      Breath sounds: Normal breath sounds.   Chest:   Breasts:     Hema Score is 5.      Breasts are symmetrical.      Right: Normal. No swelling, bleeding, inverted nipple, mass, nipple discharge, skin change or tenderness.      Left: Normal. No swelling, bleeding, inverted nipple, mass, nipple discharge, skin change or tenderness.   Abdominal:      General: Bowel sounds are normal. There is no distension.      Palpations: Abdomen is soft.      Tenderness: There is no abdominal tenderness.   Genitourinary:     General: Normal vulva.      Exam position: Lithotomy position.      Hema stage (genital): 5.      Labia:         Right: No rash, tenderness, lesion or injury.         Left: No rash, tenderness, lesion or injury.       Urethra: No prolapse, urethral pain, urethral swelling or urethral lesion.      Vagina: Normal.      Comments: Uterus, cervix and adnexa absent. Well healed vaginal cuff. Pap not indicated.  Lymphadenopathy:      Upper Body:      Right upper body: No supraclavicular, axillary or pectoral adenopathy.      Left upper body: No supraclavicular, axillary or pectoral adenopathy.      Lower Body: No right inguinal adenopathy. No left inguinal adenopathy.   Skin:     General: Skin is warm and dry.   Neurological:      Mental Status: She is alert and oriented to person, place, and time.   Psychiatric:         Attention and Perception: Attention and perception normal.         Mood and Affect: Mood and affect normal.         Speech: Speech normal.         Behavior: Behavior normal.  Behavior is cooperative.           Assessment & Plan   Diagnoses and all orders for this visit:    1. Encounter for gynecological examination without abnormal finding (Primary)    2. H/O hysterectomy for benign disease    3. Hormone replacement therapy (postmenopausal)    Other orders  -     estradiol (Minivelle) 0.1 MG/24HR patch; Place 1 patch on the skin as directed by provider 2 (Two) Times a Week.  Dispense: 8 patch; Refill: 12        Reviewed cervical and breast cancer screening recommendations; no longer needs routine pap testing. Start mammograms at age 40 annually. RTC every 2-3 years for breast/pelvic exam. RBA and potential s/e of HRT reviewed.

## 2023-01-08 ENCOUNTER — HEALTH MAINTENANCE LETTER (OUTPATIENT)
Age: 33
End: 2023-01-08

## 2023-02-08 ENCOUNTER — OFFICE VISIT (OUTPATIENT)
Dept: FAMILY MEDICINE CLINIC | Facility: CLINIC | Age: 33
End: 2023-02-08
Payer: MEDICAID

## 2023-02-08 VITALS
WEIGHT: 201 LBS | BODY MASS INDEX: 29.77 KG/M2 | OXYGEN SATURATION: 99 % | HEART RATE: 70 BPM | HEIGHT: 69 IN | SYSTOLIC BLOOD PRESSURE: 138 MMHG | DIASTOLIC BLOOD PRESSURE: 70 MMHG

## 2023-02-08 DIAGNOSIS — Z76.89 ENCOUNTER TO ESTABLISH CARE: Primary | ICD-10-CM

## 2023-02-08 DIAGNOSIS — J30.2 SEASONAL ALLERGIES: ICD-10-CM

## 2023-02-08 DIAGNOSIS — R06.2 WHEEZING: ICD-10-CM

## 2023-02-08 DIAGNOSIS — R41.840 ATTENTION DEFICIT: ICD-10-CM

## 2023-02-08 DIAGNOSIS — Z23 NEED FOR INFLUENZA VACCINATION: ICD-10-CM

## 2023-02-08 PROCEDURE — 99214 OFFICE O/P EST MOD 30 MIN: CPT | Performed by: NURSE PRACTITIONER

## 2023-02-08 RX ORDER — ALBUTEROL SULFATE 90 UG/1
2 AEROSOL, METERED RESPIRATORY (INHALATION) EVERY 4 HOURS PRN
Qty: 18 G | Refills: 11 | Status: SHIPPED | OUTPATIENT
Start: 2023-02-08

## 2023-02-08 NOTE — PROGRESS NOTES
"Chief Complaint  Establish Care    Subjective          Violetta Mejias presents to [facility] [place] to establish care. Has issues with seasonal allergies last year during the warmer months and is wanting to see about allergy shots.  She would also like to discuss getting tested for ADHD, she feels like she has trouble focusing on task, has lack of motivation and feels overwhelmed.           History of Present Illness  Possible ADHD   Lack of motivation   Overwhelmed by small task  Loss of focus       Allergic Reaction  This is a chronic problem. The current episode started more than 1 week ago. The problem occurs rarely. The problem has been waxing and waning since onset. Associated with: seasonal. Associated symptoms include coughing, eye itching, eye redness, eye watering, itching and wheezing. Pertinent negatives include no chest pressure. There is no swelling present. Past treatments include one or more OTC medications, diphenhydramine and rest. The treatment provided no relief. Her past medical history is significant for seasonal allergies.     Outpatient Medications Prior to Visit   Medication Sig Dispense Refill   • estradiol (Minivelle) 0.1 MG/24HR patch Place 1 patch on the skin as directed by provider 2 (Two) Times a Week. 8 patch 12     No facility-administered medications prior to visit.       Review of Systems   Eyes: Positive for redness and itching.   Respiratory: Positive for cough and wheezing.    Skin: Positive for itching.         Objective   Vital Signs:   Visit Vitals  /70 (BP Location: Left arm, Patient Position: Sitting, Cuff Size: Adult)   Pulse 70   Ht 175.3 cm (69.02\")   Wt 91.2 kg (201 lb)   SpO2 99%   BMI 29.67 kg/m²     Physical Exam  Vitals and nursing note reviewed.   Constitutional:       Appearance: She is well-developed.   HENT:      Head: Normocephalic and atraumatic.   Eyes:      General: Lids are normal.      " Conjunctiva/sclera: Conjunctivae normal.   Neck:      Thyroid: No thyroid mass or thyromegaly.      Trachea: Trachea normal. No tracheal tenderness.   Cardiovascular:      Rate and Rhythm: Normal rate.      Pulses: Normal pulses.      Heart sounds: Normal heart sounds.   Pulmonary:      Effort: Pulmonary effort is normal. No respiratory distress.      Breath sounds: Normal breath sounds. No wheezing.   Abdominal:      General: There is no distension.      Palpations: Abdomen is soft. There is no mass.   Musculoskeletal:         General: Normal range of motion.      Cervical back: Normal range of motion. No edema.   Skin:     General: Skin is warm and dry.      Coloration: Skin is not pale.      Findings: No abrasion, erythema or lesion.   Neurological:      Mental Status: She is alert and oriented to person, place, and time.   Psychiatric:         Mood and Affect: Mood is not anxious. Affect is not inappropriate.         Speech: Speech normal.         Behavior: Behavior normal.         Thought Content: Thought content normal.         Judgment: Judgment normal. Judgment is not impulsive.        Result Review :                 Assessment and Plan    Diagnoses and all orders for this visit:    1. Encounter to establish care (Primary)  -     TSH; Future  -     Vitamin D,25-Hydroxy; Future  -     Comprehensive Metabolic Panel; Future  -     Hemoglobin A1c; Future  -     CBC & Differential; Future  -     Vitamin B12; Future  -     Lipid Panel; Future  -     Hepatitis C Antibody; Future    2. Seasonal allergies  -     albuterol sulfate  (90 Base) MCG/ACT inhaler; Inhale 2 puffs Every 4 (Four) Hours As Needed for Wheezing.  Dispense: 18 g; Refill: 11  -     TSH; Future  -     Vitamin D,25-Hydroxy; Future  -     Comprehensive Metabolic Panel; Future  -     Hemoglobin A1c; Future  -     CBC & Differential; Future  -     Vitamin B12; Future  -     Lipid Panel; Future  -     Hepatitis C Antibody; Future  -     Ambulatory  Referral to Allergy    3. Wheezing  -     albuterol sulfate  (90 Base) MCG/ACT inhaler; Inhale 2 puffs Every 4 (Four) Hours As Needed for Wheezing.  Dispense: 18 g; Refill: 11  -     TSH; Future  -     Vitamin D,25-Hydroxy; Future  -     Comprehensive Metabolic Panel; Future  -     Hemoglobin A1c; Future  -     CBC & Differential; Future  -     Vitamin B12; Future  -     Lipid Panel; Future  -     Hepatitis C Antibody; Future  -     Ambulatory Referral to Allergy    4. Attention deficit  -     Ambulatory Referral to Psychology    5. Need for influenza vaccination  -     FluLaval/Fluzone >6 mos ([id number])      Complete order lab work  We will call with results    Referral to psych for attention deficit screening    Referral to allergy specialist for multiple allergy testing and possible allergy shots    Continue to use over-the-counter allergy medications    We will send in an albuterol inhaler for her flareups    Please call the office if you have any issues, questions or concerns        Follow Up   Return if symptoms worsen or fail to improve, for Next scheduled follow up.  Patient was given instructions and counseling regarding her condition or for health maintenance advice. Please see specific information pulled into the AVS if appropriate.           This document has been electronically signed by RIKI Mondragon on February 9, 2023 13:34 CST

## 2023-02-13 PROCEDURE — 90686 IIV4 VACC NO PRSV 0.5 ML IM: CPT | Performed by: NURSE PRACTITIONER

## 2023-02-13 PROCEDURE — 90471 IMMUNIZATION ADMIN: CPT | Performed by: NURSE PRACTITIONER

## 2023-04-04 ENCOUNTER — TELEPHONE (OUTPATIENT)
Dept: FAMILY MEDICINE CLINIC | Facility: CLINIC | Age: 33
End: 2023-04-04
Payer: MEDICAID

## 2023-04-04 NOTE — TELEPHONE ENCOUNTER
Patient called and needs something called in for a yeast infection. I told her that Rebeca was out and she wanted to see if someone else could call it in to [facility]. Phone is [phone]. I told her that whoever was on call may send it in.

## 2023-04-05 ENCOUNTER — TELEPHONE (OUTPATIENT)
Dept: FAMILY MEDICINE CLINIC | Facility: CLINIC | Age: 33
End: 2023-04-05

## 2023-04-05 DIAGNOSIS — B37.9 YEAST INFECTION: Primary | ICD-10-CM

## 2023-04-05 RX ORDER — FLUCONAZOLE 150 MG/1
150 TABLET ORAL ONCE
Qty: 1 TABLET | Refills: 0 | Status: SHIPPED | OUTPATIENT
Start: 2023-04-05 | End: 2023-04-05

## 2023-04-05 NOTE — TELEPHONE ENCOUNTER
Patient called and said that she has a yeast infection and wanted to see if you could call in something to [facility]. Call and let her know at [phone]

## 2024-02-11 ENCOUNTER — HEALTH MAINTENANCE LETTER (OUTPATIENT)
Age: 34
End: 2024-02-11

## 2025-03-08 ENCOUNTER — HEALTH MAINTENANCE LETTER (OUTPATIENT)
Age: 35
End: 2025-03-08

## (undated) DEVICE — BLADE KNIFE SURG 15 371115

## (undated) DEVICE — SOL NACL 0.9% INJ 1000ML BAG 2B1324X

## (undated) DEVICE — ESU PENCIL W/COATED BLADE E2450H

## (undated) DEVICE — SU WND CLOSURE VLOC 180 ABS 0 9" GS-21 VLOCL0346

## (undated) DEVICE — KIT PATIENT POSITIONING PIGAZZI LATEX FREE 40580

## (undated) DEVICE — SYR 10ML LL W/O NDL 302995

## (undated) DEVICE — ENDO TROCAR SLEEVE KII Z-THREADED 05X100MM CTS02

## (undated) DEVICE — SU VICRYL 2-0 UR-6 27" J602H

## (undated) DEVICE — ESU ELEC CLEANCOAT LAP L-HOOK 36CM E3773-36C

## (undated) DEVICE — SUCTION IRR STRYKERFLOW II W/TIP 250-070-520

## (undated) DEVICE — COVER CAMERA IN-LIGHT DISP LT-C02

## (undated) DEVICE — SU MONOCRYL 4-0 PS-2 27" UND Y426H

## (undated) DEVICE — DRSG TELFA 3X8" 1238

## (undated) DEVICE — PROTECTOR ARM ONE-STEP TRENDELENBURG 40418

## (undated) DEVICE — LINEN TOWEL PACK X30 5481

## (undated) DEVICE — Device

## (undated) DEVICE — TUBING SMOKE EVAC .635CMX3M SEA3705

## (undated) DEVICE — NDL INSUFFLATION 13GA 120MM C2201

## (undated) DEVICE — GLOVE PROTEXIS W/NEU-THERA 6.5  2D73TE65

## (undated) DEVICE — RETR ELEV / UTERINE MANIPULATOR V-CARE MED CUP 60-6085-201A

## (undated) DEVICE — PREP CHLORAPREP 26ML TINTED ORANGE  260815

## (undated) DEVICE — POUCH TISSUE RETRIEVAL 15MM 6.00" INTRO TRS190SB2

## (undated) DEVICE — ESU LIGASURE LAPAROSCOPIC BLUNT TIP SEALER 5MMX37CM LF1837

## (undated) DEVICE — ENDO TROCAR FIRST ENTRY KII FIOS Z-THRD 05X100MM CTF03

## (undated) DEVICE — ENDO POUCH UNIVERSAL RETRIEVAL SYSTEM INZII 12/15MM CD004

## (undated) DEVICE — SU VICRYL 0 UR-6 27" J603H

## (undated) DEVICE — KOH COLPOTOMIZER OCCLUDER  CPO-6

## (undated) DEVICE — GLOVE PROTEXIS BLUE W/NEU-THERA 7.0  2D73EB70

## (undated) DEVICE — SOL WATER IRRIG 1000ML BOTTLE 2F7114

## (undated) DEVICE — SUCTION MANIFOLD NEPTUNE 2 SYS 4 PORT 0702-020-000

## (undated) DEVICE — SPECIMEN TRAP MUCOUS 40ML LUKI C30200A

## (undated) DEVICE — ESU GROUND PAD ADULT W/CORD E7507

## (undated) DEVICE — LINEN TOWEL PACK X6 WHITE 5487

## (undated) DEVICE — ENDO ACCESS PLATFORM GELPOINT SGL INCISION CNGL2

## (undated) RX ORDER — BUPIVACAINE HYDROCHLORIDE 5 MG/ML
INJECTION, SOLUTION EPIDURAL; INTRACAUDAL
Status: DISPENSED
Start: 2021-11-05

## (undated) RX ORDER — OXYCODONE HYDROCHLORIDE 5 MG/1
TABLET ORAL
Status: DISPENSED
Start: 2021-11-05

## (undated) RX ORDER — PROPOFOL 10 MG/ML
INJECTION, EMULSION INTRAVENOUS
Status: DISPENSED
Start: 2021-11-05

## (undated) RX ORDER — IBUPROFEN 200 MG
TABLET ORAL
Status: DISPENSED
Start: 2021-11-05

## (undated) RX ORDER — FENTANYL CITRATE 50 UG/ML
INJECTION, SOLUTION INTRAMUSCULAR; INTRAVENOUS
Status: DISPENSED
Start: 2021-11-05

## (undated) RX ORDER — HEPARIN SODIUM 5000 [USP'U]/.5ML
INJECTION, SOLUTION INTRAVENOUS; SUBCUTANEOUS
Status: DISPENSED
Start: 2021-11-05

## (undated) RX ORDER — ONDANSETRON 2 MG/ML
INJECTION INTRAMUSCULAR; INTRAVENOUS
Status: DISPENSED
Start: 2021-11-05

## (undated) RX ORDER — ACETAMINOPHEN 325 MG/1
TABLET ORAL
Status: DISPENSED
Start: 2021-11-05

## (undated) RX ORDER — BUPIVACAINE HYDROCHLORIDE 2.5 MG/ML
INJECTION, SOLUTION EPIDURAL; INFILTRATION; INTRACAUDAL
Status: DISPENSED
Start: 2021-11-05